# Patient Record
Sex: FEMALE | Race: BLACK OR AFRICAN AMERICAN | NOT HISPANIC OR LATINO | Employment: OTHER | URBAN - METROPOLITAN AREA
[De-identification: names, ages, dates, MRNs, and addresses within clinical notes are randomized per-mention and may not be internally consistent; named-entity substitution may affect disease eponyms.]

---

## 2018-04-17 DIAGNOSIS — J44.89 OBSTRUCTIVE CHRONIC BRONCHITIS WITHOUT EXACERBATION: Primary | ICD-10-CM

## 2018-05-01 ENCOUNTER — HOSPITAL ENCOUNTER (OUTPATIENT)
Dept: PULMONOLOGY | Facility: CLINIC | Age: 68
Discharge: HOME | End: 2018-05-01
Payer: COMMERCIAL

## 2018-05-01 DIAGNOSIS — J44.89 OBSTRUCTIVE CHRONIC BRONCHITIS WITHOUT EXACERBATION: ICD-10-CM

## 2018-05-01 LAB
PRE FEV1 FVC: 81
PRE FEV1: 1.4
PRE FVC: 1.72
PREDICTED FEV1 FVC: 81
PREDICTED FEV1: 1.92
PREDICTED FVC: 2.43

## 2018-05-01 PROCEDURE — 94729 DIFFUSING CAPACITY: CPT | Mod: S$GLB,,, | Performed by: INTERNAL MEDICINE

## 2018-05-01 PROCEDURE — 94010 BREATHING CAPACITY TEST: CPT | Mod: S$GLB,,, | Performed by: INTERNAL MEDICINE

## 2018-07-23 ENCOUNTER — TELEPHONE (OUTPATIENT)
Dept: CARDIOLOGY | Facility: CLINIC | Age: 68
End: 2018-07-23

## 2018-07-23 DIAGNOSIS — R00.2 PALPITATIONS: Primary | ICD-10-CM

## 2018-07-30 ENCOUNTER — OFFICE VISIT (OUTPATIENT)
Dept: CARDIOLOGY | Facility: CLINIC | Age: 68
End: 2018-07-30
Payer: COMMERCIAL

## 2018-07-30 ENCOUNTER — HOSPITAL ENCOUNTER (OUTPATIENT)
Dept: CARDIOLOGY | Facility: CLINIC | Age: 68
Discharge: HOME | End: 2018-07-30
Payer: COMMERCIAL

## 2018-07-30 VITALS
BODY MASS INDEX: 37.61 KG/M2 | SYSTOLIC BLOOD PRESSURE: 180 MMHG | DIASTOLIC BLOOD PRESSURE: 90 MMHG | OXYGEN SATURATION: 96 % | WEIGHT: 191.56 LBS | HEART RATE: 76 BPM | HEIGHT: 60 IN

## 2018-07-30 DIAGNOSIS — I50.43 ACUTE ON CHRONIC COMBINED SYSTOLIC AND DIASTOLIC CONGESTIVE HEART FAILURE: Primary | ICD-10-CM

## 2018-07-30 DIAGNOSIS — I25.2 OLD MI (MYOCARDIAL INFARCTION): ICD-10-CM

## 2018-07-30 DIAGNOSIS — I25.2 HISTORY OF MYOCARDIAL INFARCTION: ICD-10-CM

## 2018-07-30 DIAGNOSIS — J43.1 PANLOBULAR EMPHYSEMA: ICD-10-CM

## 2018-07-30 DIAGNOSIS — Z86.73 HISTORY OF CVA (CEREBROVASCULAR ACCIDENT): ICD-10-CM

## 2018-07-30 DIAGNOSIS — R00.2 PALPITATIONS: ICD-10-CM

## 2018-07-30 DIAGNOSIS — I10 ESSENTIAL HYPERTENSION: ICD-10-CM

## 2018-07-30 PROCEDURE — 93000 ELECTROCARDIOGRAM COMPLETE: CPT | Mod: S$GLB,,, | Performed by: INTERNAL MEDICINE

## 2018-07-30 PROCEDURE — 99999 PR PBB SHADOW E&M-EST. PATIENT-LVL III: CPT | Mod: PBBFAC,,, | Performed by: INTERNAL MEDICINE

## 2018-07-30 PROCEDURE — 99214 OFFICE O/P EST MOD 30 MIN: CPT | Mod: S$GLB,,, | Performed by: INTERNAL MEDICINE

## 2018-07-30 RX ORDER — VALSARTAN 80 MG/1
80 TABLET ORAL DAILY
COMMUNITY

## 2018-07-30 RX ORDER — HYDROCHLOROTHIAZIDE 25 MG/1
25 TABLET ORAL DAILY
COMMUNITY

## 2018-07-30 RX ORDER — FUROSEMIDE 40 MG/1
40 TABLET ORAL 2 TIMES DAILY
Qty: 60 TABLET | Refills: 3 | Status: SHIPPED | OUTPATIENT
Start: 2018-07-30 | End: 2019-07-30

## 2018-07-30 RX ORDER — ISOSORBIDE MONONITRATE 20 MG/1
10 TABLET ORAL 2 TIMES DAILY WITH MEALS
COMMUNITY

## 2018-07-30 RX ORDER — ALBUTEROL SULFATE 0.83 MG/ML
2.5 SOLUTION RESPIRATORY (INHALATION) EVERY 6 HOURS PRN
COMMUNITY

## 2018-07-30 RX ORDER — FUROSEMIDE 40 MG/1
40 TABLET ORAL 2 TIMES DAILY
Qty: 120 TABLET | Refills: 3 | Status: SHIPPED | OUTPATIENT
Start: 2018-07-30 | End: 2018-07-30 | Stop reason: SDUPTHER

## 2018-07-30 NOTE — PROGRESS NOTES
"Subjective:   Patient ID:  Florence Faria is a 67 y.o. female is a new patient who presents for evaluation of Congestive Heart Failure; Hypertension; and Chest Pain    HPI: 67 yo female with hx of HTN, COPD, CVA, and MI in 2015 who was BIBEMS for dry cough over the last two months. She was started on lisinopril two months ago. After one month of cough, she was switched to losartan but continues to have dry cough. Patient has quit smoking approx one year ago. She also reports chest pain and sob when she gets these coughing attacks. Most recent episode was today at approx 1 pm and lasted 3-5 minutes and resolved spontaneously. She has had chest pain in the past without cough that has responded to her home NTG. Denies radiation, diaphoresis, or increased pain with exertion. Pain feels like "a pulling." She reports that her daughter had a seizure today and EMS was activated. EMS noted that she was coughing and complaining of chest pain and recommended she come to the ED.      CT head:  Extensive chronic encephalomalacia in the right MCA distribution and   the left inferior cerebellar hemisphere without posttraumatic abnormality   in the head.    HPI:   Lives by herself.  Too much walking and talking patient gets SOB this has been going on for a year.  Orthopnea, PND.   Sats 96% in the office and 94% with ambulation.     Patient Active Problem List   Diagnosis    Obstructive chronic bronchitis without exacerbation    Old MI (myocardial infarction)    Essential hypertension    History of CVA (cerebrovascular accident)     BP (!) 180/90 (BP Location: Left arm, Patient Position: Sitting, BP Method: X-Large (Automatic))   Pulse 76   Ht 4' 11.5" (1.511 m)   Wt 86.9 kg (191 lb 9.3 oz)   SpO2 96%   BMI 38.05 kg/m²   Body mass index is 38.05 kg/m².  CrCl cannot be calculated (No order found.).    No results found for: NA, K, CL, CO2, BUN, CREATININE, GLU, HGBA1C, MG, AST, ALT, ALBUMIN, PROT, BILITOT, WBC, HGB, HCT, " MCV, PLT, INR, PSA, TSH, CHOL, HDL, LDLCALC, TRIG    Current Outpatient Prescriptions   Medication Sig    albuterol (PROVENTIL) 2.5 mg /3 mL (0.083 %) nebulizer solution Take 2.5 mg by nebulization every 6 (six) hours as needed for Wheezing or Shortness of Breath. Rescue    furosemide (LASIX) 40 MG tablet Take 1 tablet (40 mg total) by mouth 2 (two) times daily.    hydroCHLOROthiazide (HYDRODIURIL) 25 MG tablet Take 25 mg by mouth once daily.    isosorbide mononitrate (ISMO,MONOKET) 20 MG Tab Take 10 mg by mouth 2 (two) times daily with meals.    valsartan (DIOVAN) 80 MG tablet Take 80 mg by mouth once daily.     No current facility-administered medications for this visit.        Review of Systems   Constitution: Negative for chills, decreased appetite, weakness, malaise/fatigue, night sweats, weight gain and weight loss.   Eyes: Negative for blurred vision, double vision, visual disturbance and visual halos.   Cardiovascular: Positive for dyspnea on exertion and orthopnea. Negative for chest pain, claudication, cyanosis, irregular heartbeat, leg swelling, near-syncope, palpitations, paroxysmal nocturnal dyspnea and syncope.   Respiratory: Negative for cough, hemoptysis, snoring, sputum production and wheezing.    Endocrine: Negative for cold intolerance, heat intolerance, polydipsia and polyphagia.   Hematologic/Lymphatic: Negative for adenopathy and bleeding problem. Does not bruise/bleed easily.   Skin: Negative for flushing, itching, poor wound healing and rash.   Musculoskeletal: Negative for arthritis, back pain, falls, gout, joint pain, joint swelling, muscle cramps, muscle weakness, myalgias, neck pain and stiffness.   Gastrointestinal: Negative for bloating, abdominal pain, anorexia, diarrhea, dysphagia, excessive appetite, flatus, hematemesis, jaundice, melena and nausea.   Genitourinary: Negative for hesitancy and incomplete emptying.   Neurological: Negative for aphonia, brief paralysis, difficulty  with concentration, disturbances in coordination, excessive daytime sleepiness, dizziness, focal weakness, light-headedness and loss of balance.   Psychiatric/Behavioral: Negative for altered mental status, depression, hallucinations, hypervigilance, memory loss, substance abuse and suicidal ideas. The patient does not have insomnia and is not nervous/anxious.        Objective:   Physical Exam   Neck: JVD present.   Pulmonary/Chest: She has rales.   Musculoskeletal: She exhibits edema.       Assessment:     1. Acute on chronic combined systolic and diastolic congestive heart failure    2. Essential hypertension    3. Panlobular emphysema    4. History of CVA (cerebrovascular accident)    5. History of myocardial infarction    6. Old MI (myocardial infarction)        Plan:   Florence was seen today for congestive heart failure, hypertension and chest pain.    Diagnoses and all orders for this visit:    Acute on chronic combined systolic and diastolic congestive heart failure  -     2D Echo w/ Color Flow Doppler; Future  -     Comprehensive metabolic panel; Future  -     Brain natriuretic peptide; Future    Essential hypertension    Panlobular emphysema    History of CVA (cerebrovascular accident)    History of myocardial infarction    Old MI (myocardial infarction)    Other orders  -     Discontinue: furosemide (LASIX) 40 MG tablet; Take 1 tablet (40 mg total) by mouth 2 (two) times daily.  -     furosemide (LASIX) 40 MG tablet; Take 1 tablet (40 mg total) by mouth 2 (two) times daily.      Continue all other meds, start lasix  Limit sodium intake to less then 2 gram sodium and 1500cc fluid restriction.  Graded exercise program as tolerated.  Call if more than 3 lbs in 1 day or 5 lbs in 1 week.  Counseled on importance of heart healthy diet low in saturated and trans fat and salt as well gradually starting a regular aerobic exercise regimen with goal of 30min 5x/week. Recommend BP diary. Call if systolic BP > 130 mmHg  on checking repeatedly    RTC 2 wks for up titration of cardiac meds.

## 2018-08-23 ENCOUNTER — OFFICE VISIT (OUTPATIENT)
Dept: CARDIOLOGY | Facility: CLINIC | Age: 68
End: 2018-08-23
Payer: COMMERCIAL

## 2018-08-23 VITALS
HEART RATE: 66 BPM | BODY MASS INDEX: 37.92 KG/M2 | DIASTOLIC BLOOD PRESSURE: 74 MMHG | SYSTOLIC BLOOD PRESSURE: 157 MMHG | HEIGHT: 60 IN | WEIGHT: 193.13 LBS

## 2018-08-23 DIAGNOSIS — I25.2 OLD MI (MYOCARDIAL INFARCTION): ICD-10-CM

## 2018-08-23 DIAGNOSIS — Z86.73 HISTORY OF CVA (CEREBROVASCULAR ACCIDENT): ICD-10-CM

## 2018-08-23 DIAGNOSIS — M15.0 PRIMARY GENERALIZED (OSTEO)ARTHRITIS: Primary | ICD-10-CM

## 2018-08-23 DIAGNOSIS — I10 ESSENTIAL HYPERTENSION: ICD-10-CM

## 2018-08-23 DIAGNOSIS — J43.1 PANLOBULAR EMPHYSEMA: ICD-10-CM

## 2018-08-23 DIAGNOSIS — I50.43 ACUTE ON CHRONIC COMBINED SYSTOLIC AND DIASTOLIC CONGESTIVE HEART FAILURE: Primary | ICD-10-CM

## 2018-08-23 DIAGNOSIS — I25.2 HISTORY OF MYOCARDIAL INFARCTION: ICD-10-CM

## 2018-08-23 PROCEDURE — 99999 PR PBB SHADOW E&M-EST. PATIENT-LVL III: CPT | Mod: PBBFAC,,, | Performed by: INTERNAL MEDICINE

## 2018-08-23 PROCEDURE — 99214 OFFICE O/P EST MOD 30 MIN: CPT | Mod: S$GLB,,, | Performed by: INTERNAL MEDICINE

## 2018-08-23 RX ORDER — METOPROLOL SUCCINATE 25 MG/1
25 TABLET, EXTENDED RELEASE ORAL DAILY
Qty: 90 TABLET | Refills: 3 | Status: SHIPPED | OUTPATIENT
Start: 2018-08-23 | End: 2019-08-23

## 2018-08-23 RX ORDER — ALBUTEROL SULFATE 90 UG/1
2 AEROSOL, METERED RESPIRATORY (INHALATION) EVERY 6 HOURS PRN
COMMUNITY

## 2018-08-23 NOTE — PROGRESS NOTES
"Subjective:   Patient ID:  Florence Faria is a 67 y.o. female who presents for follow-up of Acute on chronic combined systolic and diastolic congestive  (2 weeks fu)      HPI:   Feeling better and has been exercising. Patient has been stretching  And does walking a lot as well.   Patient's BP is normal at [facility].       Spirometry:Normal airflow. Mild restriction. Diffusion capacity is mildly decreased    Patient Active Problem List   Diagnosis    Obstructive chronic bronchitis without exacerbation    Old MI (myocardial infarction)    Essential hypertension    History of CVA (cerebrovascular accident)     BP (!) 157/74 (BP Location: Left arm, Patient Position: Sitting, BP Method: X-Large (Automatic))   Pulse 66   Ht 4' 11.5" (1.511 m)   Wt 87.6 kg (193 lb 2 oz)   BMI 38.35 kg/m²   Body mass index is 38.35 kg/m².  CrCl cannot be calculated (No order found.).    No results found for: NA, K, CL, CO2, BUN, CREATININE, GLU, HGBA1C, MG, AST, ALT, ALBUMIN, PROT, BILITOT, WBC, HGB, HCT, MCV, PLT, INR, PSA, TSH, CHOL, HDL, LDLCALC, TRIG    Current Outpatient Medications   Medication Sig    albuterol (PROVENTIL) 2.5 mg /3 mL (0.083 %) nebulizer solution Take 2.5 mg by nebulization every 6 (six) hours as needed for Wheezing or Shortness of Breath. Rescue    albuterol (VENTOLIN HFA) 90 mcg/actuation inhaler Inhale 2 puffs into the lungs every 6 (six) hours as needed for Wheezing. Rescue    furosemide (LASIX) 40 MG tablet Take 1 tablet (40 mg total) by mouth 2 (two) times daily.    hydroCHLOROthiazide (HYDRODIURIL) 25 MG tablet Take 25 mg by mouth once daily.    isosorbide mononitrate (ISMO,MONOKET) 20 MG Tab Take 10 mg by mouth 2 (two) times daily with meals.    valsartan (DIOVAN) 80 MG tablet Take 80 mg by mouth once daily.     No current facility-administered medications for this visit.        ROS    Objective:   Physical Exam    Assessment:     No diagnosis found.    Plan:     "

## 2018-08-29 ENCOUNTER — TELEPHONE (OUTPATIENT)
Dept: CARDIOLOGY | Facility: CLINIC | Age: 68
End: 2018-08-29

## 2018-08-29 NOTE — TELEPHONE ENCOUNTER
Please tell patient that I have reviewed her labs from [facility] and she should continue her medications as prescribed,

## 2018-10-04 ENCOUNTER — HOSPITAL ENCOUNTER (OUTPATIENT)
Dept: RADIOLOGY | Facility: CLINIC | Age: 68
Discharge: HOME | End: 2018-10-04
Attending: NURSE PRACTITIONER
Payer: COMMERCIAL

## 2018-10-04 DIAGNOSIS — M15.0 PRIMARY GENERALIZED (OSTEO)ARTHRITIS: ICD-10-CM

## 2018-10-04 PROCEDURE — 77080 DXA BONE DENSITY AXIAL: CPT | Mod: 26,,, | Performed by: INTERNAL MEDICINE

## 2018-10-04 PROCEDURE — 77080 DXA BONE DENSITY AXIAL: CPT | Mod: TC

## 2018-10-25 ENCOUNTER — OFFICE VISIT (OUTPATIENT)
Dept: ENDOCRINOLOGY | Facility: CLINIC | Age: 68
End: 2018-10-25
Payer: COMMERCIAL

## 2018-10-25 VITALS
HEIGHT: 60 IN | SYSTOLIC BLOOD PRESSURE: 184 MMHG | WEIGHT: 192.88 LBS | RESPIRATION RATE: 16 BRPM | BODY MASS INDEX: 37.87 KG/M2 | DIASTOLIC BLOOD PRESSURE: 96 MMHG

## 2018-10-25 DIAGNOSIS — E04.2 MULTIPLE THYROID NODULES: Primary | ICD-10-CM

## 2018-10-25 DIAGNOSIS — M81.0 AGE-RELATED OSTEOPOROSIS WITHOUT CURRENT PATHOLOGICAL FRACTURE: ICD-10-CM

## 2018-10-25 PROCEDURE — 99999 PR PBB SHADOW E&M-EST. PATIENT-LVL III: CPT | Mod: PBBFAC,,, | Performed by: INTERNAL MEDICINE

## 2018-10-25 PROCEDURE — 99204 OFFICE O/P NEW MOD 45 MIN: CPT | Mod: S$GLB,,, | Performed by: INTERNAL MEDICINE

## 2018-10-25 NOTE — PROGRESS NOTES
Chief Complaint: Thyroid Nodule      HPI:  Florence Faria is 68 y.o. female presents to clinic today for evaluation of MNG.  Patient is referred by [facility]     She states thyroid nodules were incidentally discovered in 2017 after she sustained a fall and subsequent imaging studies identified a thyroid nodule     She states she then had a repeat thyroid Ultrasound study at [facility]   Copy of report reviewed and dated 07/19/2018  Right thyroid lobe measures 4.8 x 2.24 x 2.24 cm   Isthmus measures 3.35 x 1.9 x 2.39 cm   Left thyroid lobe measures 4.9 x 2.42 x 2.31 cm   IMPRESSION:   In the right lobe there is a 0.23 and 0.3cm nodule   In the left thyroid lobe superiorly there is a nodule measuring 0.5 x 0.4 x 0.5cm   In the left thyroid lobe superiorly and laterally there is a nodule measuring 1.4 x 1.1 x 0.9cm. The left mid thyroid lobe there is a nodule measuring 0.5cm   In the lower lobe of the thyroid on the left there is a nodule measuring 1.3 x 1.2 x 0.9cm   In the isthmus area there is a poorly defined mass lesion measuring 2.5 x 1.8 x 1.9cm   Recommendations: consider biopsy of the lesion in the isthmus area under ultrasound guidance       She denies neck pain, pressure or tenderness at this time   But she endorses voice changes and shortness of breath when lying flat     She denies dysphagia     Family history:   Sister with MNG     Denies family history of thyroid cancer     Denies personal history of radiation exposure to her head, face or neck     No signs or symptoms of hyper or hypothyroidism   No weight changes  No bowel changes   No hair, nail or skin changes    Review of Systems   Constitutional: Positive for fatigue. Negative for unexpected weight change.   HENT: Positive for voice change. Negative for sore throat and trouble swallowing.    Eyes: Negative for visual disturbance.   Respiratory: Positive for cough and shortness of breath.    Cardiovascular: Positive for chest pain (occasionally ),  palpitations and leg swelling.   Gastrointestinal: Negative for constipation and diarrhea.   Endocrine: Positive for heat intolerance. Negative for cold intolerance, polydipsia, polyphagia and polyuria.   Genitourinary: Negative for dysuria and hematuria.        +incontinence    Musculoskeletal: Positive for gait problem.   Skin: Negative for rash.   Allergic/Immunologic: Negative for immunocompromised state.   Neurological: Positive for dizziness. Negative for tremors and syncope.   Hematological: Does not bruise/bleed easily.   Psychiatric/Behavioral: The patient is not nervous/anxious.      Physical Exam   Constitutional: She is oriented to person, place, and time. She appears well-developed.   HENT:   Right Ear: External ear normal.   Left Ear: External ear normal.   Nose: Nose normal.   Hearing normal  Dentition good    Neck: No tracheal deviation present. No thyromegaly present.   Left and isthmus nodule appreciated on examination    Cardiovascular: Normal rate.   No murmur heard.  Pulmonary/Chest: Effort normal and breath sounds normal.   Abdominal: Soft. There is no tenderness. No hernia.   Musculoskeletal: She exhibits no edema.   Gait normal  No clubbing or cyanosis noted   Neurological: She is alert and oriented to person, place, and time. She displays normal reflexes. No cranial nerve deficit.   Skin: No rash noted.   No nodules       Psychiatric: She has a normal mood and affect. Judgment normal.   Nursing note and vitals reviewed.      Labs:  No results found for: TSH, S2QSNFC, S7UQSHP, THYROIDAB   Outside labs in media tab dated 08/2018   TSH - 0.386 ( 0.450-4.500)   Vitamin D - 20.1     Assessment and Plan:  1. Multiple thyroid nodules  -- reviewed management options including observation, FNA or surgery  -- check TSH , if suppressed, recommend NM thyroid uptake and scan prior to biopsy to determine presence of hot/toxic thyroid nodules   -- if TSH is normal, recommend FNA biopsy of isthmus nodule  but we would like to review images if possible before proceeding with the biopsy   -- discussed possible biopsy results including benign, malignant, FLUS or non diagnostic. Advised that non diagnostic or FLUS results will require repeat FNA   -- discussed indications for repeat FNA as well as surgical indications (abnormal FNA or compressive symptoms, interval change)   -     Cancel: TSH; Future; Expected date: 10/25/2018  -     TSH; Future; Expected date: 10/25/2018      Age-related osteoporosis without current pathological fracture  -- noted on BMD from 10/2018   -- reassuring she is not fracturing   -- plan work up for accelerated causes of bone loss   -- recommend labs below   -- discussed treatment options including bisphosphonates, prolia or forteo   -- discussed duration of bisphosphonate use is unknown   -- low risk of ONJ and atypical fracture reviewed and discussed. Alerted that if dental work needs to be done it should be done prior to inititating treatment   -- due to CKD , we recommend starting Prolia 60 mg subcut. Every 6 months  -- RDA of calcium and vitamin D, calcium from food sources are preferred   -- initiate weight bearing exercises as tolerated   -- continue fall safety and fall precautions   --     Calcium, Timed Urine; Future; Expected date: 10/25/2018  -     Creatinine, urine, timed 24 Hours; Future; Expected date: 10/25/2018  -     PTH, intact; Future; Expected date: 10/25/2018  -     Renal function panel; Future; Expected date: 10/25/2018  -     Protein electrophoresis, serum; Future; Expected date: 10/25/2018  -     Tissue transglutaminase, IgA; Future; Expected date: 10/25/2018  -     Vitamin D; Future; Expected date: 10/25/2018        All orders printed and signed and faxed to [facility]   Requested thyroid ultrasound images from [facility]     She is advised to follow up with our office in 6 weeks to review test results     Case discussed in consultation with Dr. Martinez    Recommendations were  discussed with the patient in detail  The patient verbalized understanding and agrees with the plan outlined as above.

## 2018-10-25 NOTE — LETTER
October 26, 2018      Kerline Kamara, APRN  [address] [facility] [address]           [facility] - Endocrinology  [address]  [place] [address]  Phone: [phone]          Patient: Florence Faria   MR Number: [id number]   YOB: 1950   Date of Visit: 10/25/2018       Dear Kerline Kamara:    Thank you for referring Florence Faria to me for evaluation. Attached you will find relevant portions of my assessment and plan of care.    If you have questions, please do not hesitate to call me. I look forward to following Florence Faria along with you.    Sincerely,    Kimber Bobby, NP    Enclosure  CC:  No Recipients    If you would like to receive this communication electronically, please contact [email or web address] or [phone] to request more information on [facility] access.    For providers and/or their staff who would like to refer a patient to [facility], please contact us through our one-stop-shop provider referral line, [facility], at [phone].    If you feel you have received this communication in error or would no longer like to receive these types of communications, please e-mail [email or web address]

## 2018-10-25 NOTE — Clinical Note
Patti, We also need to obtain the thyroid ultrasound images from [facility] if possible. Can you see if they can mail a copy of the disc? Also, tere have her follow up in 6 weeks to review results and determine further course of action Thank you very much

## 2019-01-31 ENCOUNTER — TELEPHONE (OUTPATIENT)
Dept: ENDOCRINOLOGY | Facility: CLINIC | Age: 69
End: 2019-01-31

## 2019-01-31 DIAGNOSIS — E04.2 MULTIPLE THYROID NODULES: Primary | ICD-10-CM

## 2019-01-31 NOTE — TELEPHONE ENCOUNTER
This is an EP of Kimber but she checked out  with Dr Martinez at the last office visit.   Please have Dr Martinez review and place orders and schedule.

## 2019-01-31 NOTE — TELEPHONE ENCOUNTER
----- Message from Taylor Arizmendi sent at 1/31/2019  9:29 AM CST -----  Contact: Delia / Nati [phone]  Jarrett Lin called to schedule a FNA for PT. Delia can be reached at [phone].

## 2019-02-01 NOTE — TELEPHONE ENCOUNTER
I can order the FNA. But I think M Brimmer was trying to obtain the disc with the images prior to this to make sure the nodule meets FNA criteria, as all we had was an outside report.

## 2019-02-06 NOTE — TELEPHONE ENCOUNTER
Spoke with Delia who transferred me to the clinic line. I then left a message for Tia to return call.

## 2024-04-13 ENCOUNTER — HOSPITAL ENCOUNTER (INPATIENT)
Facility: OTHER | Age: 74
LOS: 3 days | Discharge: NURSING, REHAB OR LONG-TERM CARE | DRG: 206 | End: 2024-04-17
Attending: EMERGENCY MEDICINE | Admitting: INTERNAL MEDICINE
Payer: MEDICARE

## 2024-04-13 DIAGNOSIS — N39.0 UTI (URINARY TRACT INFECTION): ICD-10-CM

## 2024-04-13 DIAGNOSIS — R53.81 DEBILITY: ICD-10-CM

## 2024-04-13 DIAGNOSIS — K59.00 CONSTIPATION, UNSPECIFIED CONSTIPATION TYPE: ICD-10-CM

## 2024-04-13 DIAGNOSIS — I67.9 HEMIPLEGIA OF LEFT NONDOMINANT SIDE DUE TO CEREBROVASCULAR DISEASE: Chronic | ICD-10-CM

## 2024-04-13 DIAGNOSIS — R10.9 ABDOMINAL PAIN: ICD-10-CM

## 2024-04-13 DIAGNOSIS — J44.1 CHRONIC OBSTRUCTIVE PULMONARY DISEASE WITH ACUTE EXACERBATION: ICD-10-CM

## 2024-04-13 DIAGNOSIS — R06.00 DYSPNEA: ICD-10-CM

## 2024-04-13 DIAGNOSIS — N30.00 ACUTE CYSTITIS WITHOUT HEMATURIA: Primary | ICD-10-CM

## 2024-04-13 DIAGNOSIS — S22.32XA CLOSED FRACTURE OF ONE RIB OF LEFT SIDE: ICD-10-CM

## 2024-04-13 DIAGNOSIS — G81.94 HEMIPLEGIA OF LEFT NONDOMINANT SIDE DUE TO CEREBROVASCULAR DISEASE: Chronic | ICD-10-CM

## 2024-04-13 PROBLEM — F33.1 DEPRESSION, MAJOR, RECURRENT, MODERATE: Status: RESOLVED | Noted: 2024-03-12 | Resolved: 2024-04-13

## 2024-04-13 PROBLEM — I63.9 CEREBROVASCULAR ACCIDENT (CVA): Status: ACTIVE | Noted: 2020-08-27

## 2024-04-13 PROBLEM — K21.9 GERD (GASTROESOPHAGEAL REFLUX DISEASE): Status: ACTIVE | Noted: 2024-04-13

## 2024-04-13 PROBLEM — E78.5 HYPERLIPIDEMIA: Chronic | Status: ACTIVE | Noted: 2023-01-05

## 2024-04-13 PROBLEM — I25.2 HISTORY OF MYOCARDIAL INFARCTION: Chronic | Status: ACTIVE | Noted: 2018-07-30

## 2024-04-13 PROBLEM — J44.9 CHRONIC OBSTRUCTIVE PULMONARY DISEASE: Status: ACTIVE | Noted: 2019-02-12

## 2024-04-13 PROBLEM — I10 ESSENTIAL HYPERTENSION: Chronic | Status: ACTIVE | Noted: 2018-07-30

## 2024-04-13 PROBLEM — J44.9 CHRONIC OBSTRUCTIVE PULMONARY DISEASE: Chronic | Status: ACTIVE | Noted: 2019-02-12

## 2024-04-13 PROBLEM — I25.10 CORONARY ARTERY DISEASE: Status: ACTIVE | Noted: 2019-09-25

## 2024-04-13 PROBLEM — N39.3 STRESS INCONTINENCE: Chronic | Status: ACTIVE | Noted: 2024-03-12

## 2024-04-13 PROBLEM — N39.3 STRESS INCONTINENCE: Status: ACTIVE | Noted: 2024-03-12

## 2024-04-13 PROBLEM — H90.3 BILATERAL NEURAL HEARING LOSS: Chronic | Status: ACTIVE | Noted: 2024-03-12

## 2024-04-13 PROBLEM — Z86.73 HISTORY OF RIGHT MCA STROKE: Chronic | Status: ACTIVE | Noted: 2018-07-30

## 2024-04-13 PROBLEM — G47.00 INSOMNIA: Status: ACTIVE | Noted: 2024-03-12

## 2024-04-13 PROBLEM — Z87.898 HISTORY OF SEIZURES: Chronic | Status: ACTIVE | Noted: 2024-03-12

## 2024-04-13 PROBLEM — I63.9 CEREBROVASCULAR ACCIDENT (CVA): Status: RESOLVED | Noted: 2020-08-27 | Resolved: 2024-04-13
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
## 2024-04-13 PROBLEM — I25.10 CORONARY ARTERY DISEASE: Chronic | Status: ACTIVE | Noted: 2019-09-25

## 2024-04-13 PROBLEM — R47.1 DYSARTHRIA: Status: ACTIVE | Noted: 2021-06-17

## 2024-04-13 PROBLEM — G40.909 SEIZURE DISORDER: Status: ACTIVE | Noted: 2024-03-12

## 2024-04-13 PROBLEM — F33.1 DEPRESSION, MAJOR, RECURRENT, MODERATE: Status: ACTIVE | Noted: 2024-03-12

## 2024-04-13 PROBLEM — G47.00 INSOMNIA: Status: RESOLVED | Noted: 2024-03-12 | Resolved: 2024-04-13

## 2024-04-13 PROBLEM — H90.3 BILATERAL NEURAL HEARING LOSS: Status: ACTIVE | Noted: 2024-03-12

## 2024-04-13 PROBLEM — E78.5 HYPERLIPIDEMIA: Status: ACTIVE | Noted: 2023-01-05

## 2024-04-13 PROBLEM — R47.1 DYSARTHRIA: Chronic | Status: ACTIVE | Noted: 2021-06-17

## 2024-04-13 LAB
ALBUMIN SERPL BCP-MCNC: 3.6 G/DL (ref 3.5–5.2)
ALP SERPL-CCNC: 144 U/L (ref 55–135)
ALT SERPL W/O P-5'-P-CCNC: 9 U/L (ref 10–44)
ANION GAP SERPL CALC-SCNC: 11 MMOL/L (ref 8–16)
AST SERPL-CCNC: 19 U/L (ref 10–40)
BACTERIA #/AREA URNS HPF: ABNORMAL /HPF
BASOPHILS # BLD AUTO: 0.01 K/UL (ref 0–0.2)
BASOPHILS NFR BLD: 0.1 % (ref 0–1.9)
BILIRUB SERPL-MCNC: 0.4 MG/DL (ref 0.1–1)
BILIRUB UR QL STRIP: NEGATIVE
BNP SERPL-MCNC: 51 PG/ML (ref 0–99)
BUN SERPL-MCNC: 14 MG/DL (ref 8–23)
CALCIUM SERPL-MCNC: 9.3 MG/DL (ref 8.7–10.5)
CHLORIDE SERPL-SCNC: 107 MMOL/L (ref 95–110)
CLARITY UR: ABNORMAL
CO2 SERPL-SCNC: 16 MMOL/L (ref 23–29)
COLOR UR: YELLOW
CREAT SERPL-MCNC: 1.2 MG/DL (ref 0.5–1.4)
CTP QC/QA: YES
DIFFERENTIAL METHOD BLD: ABNORMAL
EOSINOPHIL # BLD AUTO: 0 K/UL (ref 0–0.5)
EOSINOPHIL NFR BLD: 0.1 % (ref 0–8)
ERYTHROCYTE [DISTWIDTH] IN BLOOD BY AUTOMATED COUNT: 14.1 % (ref 11.5–14.5)
EST. GFR  (NO RACE VARIABLE): 47 ML/MIN/1.73 M^2
GLUCOSE SERPL-MCNC: 131 MG/DL (ref 70–110)
GLUCOSE UR QL STRIP: NEGATIVE
HCT VFR BLD AUTO: 43.4 % (ref 37–48.5)
HGB BLD-MCNC: 14.2 G/DL (ref 12–16)
HGB UR QL STRIP: ABNORMAL
IMM GRANULOCYTES # BLD AUTO: 0.03 K/UL (ref 0–0.04)
IMM GRANULOCYTES NFR BLD AUTO: 0.3 % (ref 0–0.5)
KETONES UR QL STRIP: NEGATIVE
LEUKOCYTE ESTERASE UR QL STRIP: ABNORMAL
LYMPHOCYTES # BLD AUTO: 1.5 K/UL (ref 1–4.8)
LYMPHOCYTES NFR BLD: 17.7 % (ref 18–48)
MCH RBC QN AUTO: 30.1 PG (ref 27–31)
MCHC RBC AUTO-ENTMCNC: 32.7 G/DL (ref 32–36)
MCV RBC AUTO: 92 FL (ref 82–98)
MICROSCOPIC COMMENT: ABNORMAL
MONOCYTES # BLD AUTO: 0.3 K/UL (ref 0.3–1)
MONOCYTES NFR BLD: 3 % (ref 4–15)
NEUTROPHILS # BLD AUTO: 6.8 K/UL (ref 1.8–7.7)
NEUTROPHILS NFR BLD: 78.8 % (ref 38–73)
NITRITE UR QL STRIP: POSITIVE
NRBC BLD-RTO: 0 /100 WBC
PH UR STRIP: 6 [PH] (ref 5–8)
PLATELET # BLD AUTO: 174 K/UL (ref 150–450)
PMV BLD AUTO: 11.2 FL (ref 9.2–12.9)
POTASSIUM SERPL-SCNC: 4.3 MMOL/L (ref 3.5–5.1)
PROT SERPL-MCNC: 8.1 G/DL (ref 6–8.4)
PROT UR QL STRIP: NEGATIVE
RBC # BLD AUTO: 4.72 M/UL (ref 4–5.4)
RBC #/AREA URNS HPF: 1 /HPF (ref 0–4)
SARS-COV-2 RDRP RESP QL NAA+PROBE: NEGATIVE
SODIUM SERPL-SCNC: 134 MMOL/L (ref 136–145)
SP GR UR STRIP: 1.02 (ref 1–1.03)
SQUAMOUS #/AREA URNS HPF: 1 /HPF
URN SPEC COLLECT METH UR: ABNORMAL
UROBILINOGEN UR STRIP-ACNC: NEGATIVE EU/DL
WBC # BLD AUTO: 8.6 K/UL (ref 3.9–12.7)
WBC #/AREA URNS HPF: 27 /HPF (ref 0–5)
WBC CLUMPS URNS QL MICRO: ABNORMAL

## 2024-04-13 PROCEDURE — 97165 OT EVAL LOW COMPLEX 30 MIN: CPT

## 2024-04-13 PROCEDURE — 94799 UNLISTED PULMONARY SVC/PX: CPT | Mod: XB

## 2024-04-13 PROCEDURE — 85025 COMPLETE CBC W/AUTO DIFF WBC: CPT | Performed by: EMERGENCY MEDICINE

## 2024-04-13 PROCEDURE — 99900035 HC TECH TIME PER 15 MIN (STAT)

## 2024-04-13 PROCEDURE — 63600175 PHARM REV CODE 636 W HCPCS: Performed by: INTERNAL MEDICINE

## 2024-04-13 PROCEDURE — 25000242 PHARM REV CODE 250 ALT 637 W/ HCPCS: Performed by: EMERGENCY MEDICINE

## 2024-04-13 PROCEDURE — 87040 BLOOD CULTURE FOR BACTERIA: CPT | Mod: 59 | Performed by: EMERGENCY MEDICINE

## 2024-04-13 PROCEDURE — G0378 HOSPITAL OBSERVATION PER HR: HCPCS

## 2024-04-13 PROCEDURE — 25000003 PHARM REV CODE 250: Performed by: INTERNAL MEDICINE

## 2024-04-13 PROCEDURE — 25000242 PHARM REV CODE 250 ALT 637 W/ HCPCS: Performed by: INTERNAL MEDICINE

## 2024-04-13 PROCEDURE — 94640 AIRWAY INHALATION TREATMENT: CPT | Mod: XB

## 2024-04-13 PROCEDURE — 83880 ASSAY OF NATRIURETIC PEPTIDE: CPT | Performed by: EMERGENCY MEDICINE

## 2024-04-13 PROCEDURE — 87635 SARS-COV-2 COVID-19 AMP PRB: CPT | Performed by: EMERGENCY MEDICINE

## 2024-04-13 PROCEDURE — 96376 TX/PRO/DX INJ SAME DRUG ADON: CPT

## 2024-04-13 PROCEDURE — 63700000 PHARM REV CODE 250 ALT 637 W/O HCPCS: Performed by: EMERGENCY MEDICINE

## 2024-04-13 PROCEDURE — 27000221 HC OXYGEN, UP TO 24 HOURS

## 2024-04-13 PROCEDURE — 25000003 PHARM REV CODE 250: Performed by: EMERGENCY MEDICINE

## 2024-04-13 PROCEDURE — 93010 ELECTROCARDIOGRAM REPORT: CPT | Mod: ,,, | Performed by: INTERNAL MEDICINE

## 2024-04-13 PROCEDURE — 96365 THER/PROPH/DIAG IV INF INIT: CPT

## 2024-04-13 PROCEDURE — 87186 SC STD MICRODIL/AGAR DIL: CPT | Performed by: EMERGENCY MEDICINE

## 2024-04-13 PROCEDURE — 80053 COMPREHEN METABOLIC PANEL: CPT | Performed by: EMERGENCY MEDICINE

## 2024-04-13 PROCEDURE — 97161 PT EVAL LOW COMPLEX 20 MIN: CPT

## 2024-04-13 PROCEDURE — 87086 URINE CULTURE/COLONY COUNT: CPT | Performed by: EMERGENCY MEDICINE

## 2024-04-13 PROCEDURE — 96375 TX/PRO/DX INJ NEW DRUG ADDON: CPT

## 2024-04-13 PROCEDURE — 94761 N-INVAS EAR/PLS OXIMETRY MLT: CPT

## 2024-04-13 PROCEDURE — 87088 URINE BACTERIA CULTURE: CPT | Performed by: EMERGENCY MEDICINE

## 2024-04-13 PROCEDURE — 81000 URINALYSIS NONAUTO W/SCOPE: CPT | Performed by: EMERGENCY MEDICINE

## 2024-04-13 PROCEDURE — 96372 THER/PROPH/DIAG INJ SC/IM: CPT | Performed by: INTERNAL MEDICINE

## 2024-04-13 PROCEDURE — 99285 EMERGENCY DEPT VISIT HI MDM: CPT | Mod: 25

## 2024-04-13 PROCEDURE — 87077 CULTURE AEROBIC IDENTIFY: CPT | Performed by: EMERGENCY MEDICINE

## 2024-04-13 PROCEDURE — 63600175 PHARM REV CODE 636 W HCPCS: Performed by: EMERGENCY MEDICINE

## 2024-04-13 PROCEDURE — 93005 ELECTROCARDIOGRAM TRACING: CPT

## 2024-04-13 RX ORDER — ONDANSETRON HYDROCHLORIDE 2 MG/ML
4 INJECTION, SOLUTION INTRAVENOUS EVERY 6 HOURS PRN
Status: DISCONTINUED | OUTPATIENT
Start: 2024-04-13 | End: 2024-04-17 | Stop reason: HOSPADM

## 2024-04-13 RX ORDER — FAMOTIDINE 20 MG/1
20 TABLET, FILM COATED ORAL DAILY
Status: DISCONTINUED | OUTPATIENT
Start: 2024-04-13 | End: 2024-04-17 | Stop reason: HOSPADM

## 2024-04-13 RX ORDER — LOSARTAN POTASSIUM 50 MG/1
100 TABLET ORAL DAILY
Status: DISCONTINUED | OUTPATIENT
Start: 2024-04-13 | End: 2024-04-17 | Stop reason: HOSPADM

## 2024-04-13 RX ORDER — TALC
6 POWDER (GRAM) TOPICAL NIGHTLY PRN
Status: DISCONTINUED | OUTPATIENT
Start: 2024-04-13 | End: 2024-04-17 | Stop reason: HOSPADM

## 2024-04-13 RX ORDER — AMOXICILLIN 250 MG
1 CAPSULE ORAL 2 TIMES DAILY
Status: DISCONTINUED | OUTPATIENT
Start: 2024-04-13 | End: 2024-04-17 | Stop reason: HOSPADM

## 2024-04-13 RX ORDER — ENOXAPARIN SODIUM 100 MG/ML
40 INJECTION SUBCUTANEOUS EVERY 24 HOURS
Status: DISCONTINUED | OUTPATIENT
Start: 2024-04-13 | End: 2024-04-17 | Stop reason: HOSPADM

## 2024-04-13 RX ORDER — IPRATROPIUM BROMIDE AND ALBUTEROL SULFATE 2.5; .5 MG/3ML; MG/3ML
3 SOLUTION RESPIRATORY (INHALATION) EVERY 4 HOURS PRN
Status: DISCONTINUED | OUTPATIENT
Start: 2024-04-13 | End: 2024-04-16

## 2024-04-13 RX ORDER — MORPHINE SULFATE 4 MG/ML
4 INJECTION, SOLUTION INTRAMUSCULAR; INTRAVENOUS EVERY 4 HOURS PRN
Status: DISCONTINUED | OUTPATIENT
Start: 2024-04-13 | End: 2024-04-14

## 2024-04-13 RX ORDER — POLYETHYLENE GLYCOL 3350 17 G/17G
17 POWDER, FOR SOLUTION ORAL 2 TIMES DAILY
Status: DISCONTINUED | OUTPATIENT
Start: 2024-04-13 | End: 2024-04-17 | Stop reason: HOSPADM

## 2024-04-13 RX ORDER — SODIUM CHLORIDE 0.9 % (FLUSH) 0.9 %
10 SYRINGE (ML) INJECTION
Status: DISCONTINUED | OUTPATIENT
Start: 2024-04-13 | End: 2024-04-17 | Stop reason: HOSPADM

## 2024-04-13 RX ORDER — MORPHINE SULFATE 2 MG/ML
2 INJECTION, SOLUTION INTRAMUSCULAR; INTRAVENOUS
Status: COMPLETED | OUTPATIENT
Start: 2024-04-13 | End: 2024-04-13

## 2024-04-13 RX ORDER — CARVEDILOL 12.5 MG/1
1 TABLET ORAL 2 TIMES DAILY WITH MEALS
COMMUNITY
Start: 2024-03-12

## 2024-04-13 RX ORDER — ONDANSETRON HYDROCHLORIDE 2 MG/ML
4 INJECTION, SOLUTION INTRAVENOUS
Status: COMPLETED | OUTPATIENT
Start: 2024-04-13 | End: 2024-04-13

## 2024-04-13 RX ORDER — AZITHROMYCIN 250 MG/1
500 TABLET, FILM COATED ORAL
Status: COMPLETED | OUTPATIENT
Start: 2024-04-13 | End: 2024-04-13

## 2024-04-13 RX ORDER — ACETAMINOPHEN 500 MG
1000 TABLET ORAL 3 TIMES DAILY
Status: DISCONTINUED | OUTPATIENT
Start: 2024-04-13 | End: 2024-04-17 | Stop reason: HOSPADM

## 2024-04-13 RX ORDER — ATORVASTATIN CALCIUM 20 MG/1
80 TABLET, FILM COATED ORAL DAILY
Status: DISCONTINUED | OUTPATIENT
Start: 2024-04-13 | End: 2024-04-17 | Stop reason: HOSPADM

## 2024-04-13 RX ORDER — ASPIRIN 81 MG/1
81 TABLET ORAL DAILY
Status: DISCONTINUED | OUTPATIENT
Start: 2024-04-13 | End: 2024-04-17 | Stop reason: HOSPADM

## 2024-04-13 RX ORDER — METHOCARBAMOL 500 MG/1
500 TABLET, FILM COATED ORAL 3 TIMES DAILY PRN
Status: DISCONTINUED | OUTPATIENT
Start: 2024-04-13 | End: 2024-04-17 | Stop reason: HOSPADM

## 2024-04-13 RX ORDER — ALBUTEROL SULFATE 2.5 MG/.5ML
2.5 SOLUTION RESPIRATORY (INHALATION)
Status: COMPLETED | OUTPATIENT
Start: 2024-04-13 | End: 2024-04-13

## 2024-04-13 RX ORDER — ASPIRIN 81 MG/1
1 TABLET ORAL DAILY
COMMUNITY
Start: 2024-03-12

## 2024-04-13 RX ORDER — ATORVASTATIN CALCIUM 80 MG/1
1 TABLET, FILM COATED ORAL DAILY
COMMUNITY
Start: 2024-03-12

## 2024-04-13 RX ORDER — AMLODIPINE BESYLATE 5 MG/1
5 TABLET ORAL DAILY
Status: DISCONTINUED | OUTPATIENT
Start: 2024-04-13 | End: 2024-04-17 | Stop reason: HOSPADM

## 2024-04-13 RX ORDER — MORPHINE SULFATE 2 MG/ML
2 INJECTION, SOLUTION INTRAMUSCULAR; INTRAVENOUS EVERY 4 HOURS PRN
Status: DISCONTINUED | OUTPATIENT
Start: 2024-04-13 | End: 2024-04-14

## 2024-04-13 RX ORDER — ONDANSETRON 4 MG/1
4 TABLET, ORALLY DISINTEGRATING ORAL EVERY 6 HOURS PRN
Status: DISCONTINUED | OUTPATIENT
Start: 2024-04-13 | End: 2024-04-17 | Stop reason: HOSPADM

## 2024-04-13 RX ORDER — CARVEDILOL 12.5 MG/1
12.5 TABLET ORAL 2 TIMES DAILY WITH MEALS
Status: DISCONTINUED | OUTPATIENT
Start: 2024-04-13 | End: 2024-04-17 | Stop reason: HOSPADM

## 2024-04-13 RX ORDER — ACETAMINOPHEN 325 MG/1
650 TABLET ORAL EVERY 4 HOURS PRN
Status: DISCONTINUED | OUTPATIENT
Start: 2024-04-13 | End: 2024-04-13

## 2024-04-13 RX ORDER — TIOTROPIUM BROMIDE AND OLODATEROL 3.124; 2.736 UG/1; UG/1
2 SPRAY, METERED RESPIRATORY (INHALATION) DAILY
COMMUNITY
Start: 2024-03-12

## 2024-04-13 RX ORDER — FUROSEMIDE 20 MG/1
40 TABLET ORAL DAILY
Status: DISCONTINUED | OUTPATIENT
Start: 2024-04-13 | End: 2024-04-17 | Stop reason: HOSPADM

## 2024-04-13 RX ORDER — FAMOTIDINE 20 MG/1
1 TABLET, FILM COATED ORAL DAILY
COMMUNITY
Start: 2024-03-12

## 2024-04-13 RX ORDER — FLUTICASONE FUROATE AND VILANTEROL 100; 25 UG/1; UG/1
1 POWDER RESPIRATORY (INHALATION) DAILY
Status: DISCONTINUED | OUTPATIENT
Start: 2024-04-13 | End: 2024-04-17 | Stop reason: HOSPADM

## 2024-04-13 RX ORDER — AMLODIPINE BESYLATE 5 MG/1
1 TABLET ORAL DAILY
Status: ON HOLD | COMMUNITY
Start: 2024-03-12 | End: 2024-04-17

## 2024-04-13 RX ORDER — LOSARTAN POTASSIUM 100 MG/1
1 TABLET ORAL DAILY
COMMUNITY
Start: 2024-03-12

## 2024-04-13 RX ORDER — BENZONATATE 100 MG/1
100 CAPSULE ORAL
Status: COMPLETED | OUTPATIENT
Start: 2024-04-13 | End: 2024-04-13

## 2024-04-13 RX ADMIN — POLYETHYLENE GLYCOL 3350 17 G: 17 POWDER, FOR SOLUTION ORAL at 08:04

## 2024-04-13 RX ADMIN — ALBUTEROL SULFATE 2.5 MG: 2.5 SOLUTION RESPIRATORY (INHALATION) at 04:04

## 2024-04-13 RX ADMIN — ASPIRIN 81 MG: 81 TABLET, COATED ORAL at 09:04

## 2024-04-13 RX ADMIN — CARVEDILOL 12.5 MG: 12.5 TABLET, FILM COATED ORAL at 09:04

## 2024-04-13 RX ADMIN — CEFTRIAXONE SODIUM 1 G: 1 INJECTION, POWDER, FOR SOLUTION INTRAMUSCULAR; INTRAVENOUS at 04:04

## 2024-04-13 RX ADMIN — ACETAMINOPHEN 1000 MG: 500 TABLET ORAL at 08:04

## 2024-04-13 RX ADMIN — CARVEDILOL 12.5 MG: 12.5 TABLET, FILM COATED ORAL at 05:04

## 2024-04-13 RX ADMIN — LOSARTAN POTASSIUM 100 MG: 50 TABLET, FILM COATED ORAL at 09:04

## 2024-04-13 RX ADMIN — SENNOSIDES AND DOCUSATE SODIUM 1 TABLET: 8.6; 5 TABLET ORAL at 09:04

## 2024-04-13 RX ADMIN — MORPHINE SULFATE 2 MG: 2 INJECTION, SOLUTION INTRAMUSCULAR; INTRAVENOUS at 04:04

## 2024-04-13 RX ADMIN — SENNOSIDES AND DOCUSATE SODIUM 1 TABLET: 8.6; 5 TABLET ORAL at 08:04

## 2024-04-13 RX ADMIN — AZITHROMYCIN DIHYDRATE 500 MG: 250 TABLET ORAL at 05:04

## 2024-04-13 RX ADMIN — ENOXAPARIN SODIUM 40 MG: 40 INJECTION SUBCUTANEOUS at 05:04

## 2024-04-13 RX ADMIN — MORPHINE SULFATE 4 MG: 4 INJECTION, SOLUTION INTRAMUSCULAR; INTRAVENOUS at 07:04

## 2024-04-13 RX ADMIN — BENZONATATE 100 MG: 100 CAPSULE ORAL at 04:04

## 2024-04-13 RX ADMIN — ONDANSETRON 4 MG: 2 INJECTION INTRAMUSCULAR; INTRAVENOUS at 03:04

## 2024-04-13 RX ADMIN — FLUTICASONE FUROATE AND VILANTEROL TRIFENATATE 1 PUFF: 100; 25 POWDER RESPIRATORY (INHALATION) at 08:04

## 2024-04-13 RX ADMIN — MORPHINE SULFATE 4 MG: 4 INJECTION, SOLUTION INTRAMUSCULAR; INTRAVENOUS at 11:04

## 2024-04-13 RX ADMIN — METHOCARBAMOL 500 MG: 500 TABLET ORAL at 05:04

## 2024-04-13 RX ADMIN — AMLODIPINE BESYLATE 5 MG: 5 TABLET ORAL at 09:04

## 2024-04-13 RX ADMIN — FUROSEMIDE 40 MG: 20 TABLET ORAL at 09:04

## 2024-04-13 RX ADMIN — TIOTROPIUM BROMIDE INHALATION SPRAY 2 PUFF: 3.12 SPRAY, METERED RESPIRATORY (INHALATION) at 08:04

## 2024-04-13 RX ADMIN — MORPHINE SULFATE 4 MG: 4 INJECTION, SOLUTION INTRAMUSCULAR; INTRAVENOUS at 03:04

## 2024-04-13 RX ADMIN — FAMOTIDINE 20 MG: 20 TABLET ORAL at 09:04

## 2024-04-13 RX ADMIN — POLYETHYLENE GLYCOL 3350 17 G: 17 POWDER, FOR SOLUTION ORAL at 09:04

## 2024-04-13 RX ADMIN — ACETAMINOPHEN 650 MG: 325 TABLET, FILM COATED ORAL at 07:04

## 2024-04-13 NOTE — ASSESSMENT & PLAN NOTE
- Start polyethylene glycol 17g PO BID, senna-docusate 8.6-50mg PO BID.  - Monitor for BM and adjust as required.

## 2024-04-13 NOTE — PT/OT/SLP EVAL
Physical Therapy Evaluation    Patient Name:  Florence Faria   MRN:  [id number]    Recommendations:     Discharge Recommendations:  (DEFER RECS UNTIL OOB assessed)   Discharge Equipment Recommendations: none (assess when OOB assessed)   Barriers to discharge: Decreased caregiver support    Assessment:     Florence Faria is a 73 y.o. female admitted with a medical diagnosis of Closed fracture of one rib of left side.  She presents with the following impairments/functional limitations: weakness, impaired endurance, impaired sensation, impaired self care skills, impaired functional mobility, gait instability, impaired balance, decreased ROM, decreased lower extremity function, decreased upper extremity function, pain .      Pt presents with left hemiplegia has 2/5 in left dorsiflexors unable to assess more proximal muscles of leg today secondary to pain. Obtained history with OT but once moving patient she started having abdominal pain and asked to defer mobility until tomorrow. For this reason, defer all recs until tomorrow.     Rehab Prognosis: Fair; patient would benefit from acute skilled PT services to address these deficits and reach maximum level of function.    Recent Surgery: * No surgery found *      Plan:     During this hospitalization, patient to be seen 5 x/week to address the identified rehab impairments via gait training, therapeutic activities, therapeutic exercises and progress toward the following goals:    Plan of Care Expires:  05/14/24    Subjective     Chief Complaint: abdominal pain   Patient/Family Comments/goals: I will do as much as I can today.   Pain/Comfort:  Pain Rating 1: 8/10  Location - Side 1: Bilateral  Location - Orientation 1: midline  Location 1: abdomen  Pain Addressed 1: Pre-medicate for activity, Reposition, Distraction, Cessation of Activity  Pain Rating Post-Intervention 1: 0/10    Patients cultural, spiritual, Yazidi conflicts given the current situation:  "no    Living Environment:  Pt lives alone in a single story senior living aptmt with 0 steps to enter    Pt has a tub/shower with grab bars and TTB, standard toilet with grab bars but has BSC near bed.   Prior level of function:  Ambulation: w/c, but can self transfer using ivon walker   Falls: denies     Prior to admission, patients level of function was mod i.  Equipment used at home: bedside commode, bath bench, wheelchair, walker, ivon, oxygen, grab bar.  DME owned (not currently used): none.  Upon discharge, patient will have assistance from daughter ("disabled " with disabled son).    Objective:     Communicated with nurses prior to session.  Patient found left sidelying with blood pressure cuff, peripheral IV, pulse ox (continuous), telemetry, rosario catheter, oxygen (2L)  upon PT entry to room.    General Precautions: Standard, fall  Orthopedic Precautions:N/A   Braces: N/A  Respiratory Status: Nasal cannula, flow 2 L/min    Exams:  Cognitive Exam:  Patient is oriented to Person, Place, and Situation  LLE ROM: Deficits: deficits throughout LE  LLE Strength: 2/5 DF - proximal muscles not tested    Functional Mobility:  Rolling to left/right = min A  Scooting HOB Max of 2 persons      AM-PAC 6 CLICK MOBILITY  Total Score:9       Treatment & Education:   PT educated patient on the following:   PT plan of care/role of PT  Safety with transfers, use of AD, OOB mobility  Not to get OOB without nurse present   Use of call button for needs (including to get OOB)  Discharge disposition    Pt verbalized understanding      Patient left HOB elevated with all lines intact, call button in reach, and nurse present.    GOALS:   Multidisciplinary Problems       Physical Therapy Goals          Problem: Physical Therapy    Goal Priority Disciplines Outcome Goal Variances Interventions   Physical Therapy Goal     PT, PT/OT Ongoing, Progressing     Description: ASSESS OUT OF BED MOBILITY and develop appropriate goals    "                    History:     Past Medical History:   Diagnosis Date    Heart attack     Stroke        No past surgical history on file.    Time Tracking:     PT Received On: 04/13/24  PT Start Time: 0910     PT Stop Time: 0930  PT Total Time (min): 20 min     Billable Minutes: Evaluation 10 minutes co eval with OT      04/13/2024

## 2024-04-13 NOTE — ED TRIAGE NOTES
73 YOF presents to ED with c/o RUQ 8/10, n/v and worsening SOB tonight. - red tint. -diarrhea. PMH COPD on 2 L nc. Per EMS administered duoneb en route with relief. -CP. Currently on 2 L nc O2 94-96% on 2 L nc. A&Ox4. Denies any other complaints.     LOC: The patient is awake, alert and aware of environment with an appropriate affect, the patient is oriented x 3.  APPEARANCE: Patient resting comfortably and in no acute distress, patient is clean and well groomed, patient's clothing is properly fastened.  SKIN: The skin is warm and dry, patient has normal skin turgor and moist mucus membranes, skin intact, no breakdown or brusing noted.  MUSKULOSKELETAL: PMH Stroke with deficits noted to left upper and left lower extremity.   RESPIRATORY: Airway is open and patent, respirations are spontaneous, patient has a normal effort and rate.  CARDIAC: Peripheral edema.    ED workup in progress. VSS. Safety measures in place; side rails up x2. Call light within pt reach. Will continue to monitor.

## 2024-04-13 NOTE — PT/OT/SLP EVAL
"Occupational Therapy   Evaluation    Name: Florence Faria  MRN: [id number]  Admitting Diagnosis: Closed fracture of one rib of left side  Recent Surgery: * No surgery found *      Recommendations:     Discharge Recommendations:  (Defer at this time, pending assessment of EOB/OOB activity)  Discharge Equipment Recommendations:   (TBD)  Barriers to discharge:  Decreased caregiver support (Current functional level)    Assessment:     Florence Faria is a 73 y.o. female with a medical diagnosis of Closed fracture of one rib of left side.  She presents with the following performance deficits affecting function: weakness, impaired endurance, impaired sensation, impaired self care skills, impaired functional mobility, gait instability, impaired balance, decreased upper extremity function, decreased lower extremity function, impaired coordination, pain, decreased ROM.      OT orders received, chart reviewed. Evaluation completed, POC established. Evaluation limited this date 2/2 pt with 8/10 pain, attempted sup>sit but pt needing to lay back down immediately 2/2 pain. Pt with L ivon, L UE contracture at baseline 2/2 previous CVA. Will continue to follow to assess EOB/OOB ADL/mobility and progress as tolerated. It appears that pt has been having difficulty caring for herself at home, has some assist from family and is awaiting approval of caregiver from insurance. DC and DME recs pending further assessment of EOB/OOB ADL/mobility.     Rehab Prognosis: Fair; patient would benefit from acute skilled OT services to address these deficits and reach maximum level of function.       Plan:     Patient to be seen 5 x/week to address the above listed problems via self-care/home management, therapeutic activities, therapeutic exercises, neuromuscular re-education  Plan of Care Expires:    Plan of Care Reviewed with: patient    Subjective     Chief Complaint: abdominal pain  Patient/Family Comments/goals: "I try to be as " "independent as I can"    Occupational Profile:  Living Environment: Pt lives in a senior living apt, elevator access (level entry to building). Pt has a tub/shower with a tub bench and grab bars. Pt has a raised toilet with rails.   Previous level of function: Pt reporting difficulty with all ADL - completes as much as possible herself but relies on neighbors/family for assist. Pt reports she primarily uses w/c for mobility but states she uses ivon-walker for mobility in bathroom, most difficulty with tub transfers. Pt relies on R[employer] for transport to grocery/appts. Pt is waiting on approval from [employer] for caregiver. Pt's daughter is able to assist occasionally but pt reports she is "disabled " herself and is caregiver for son with CP.   Roles and Routines: mother to adult children, grandmother  Equipment Used at Home: bath bench, grab bar, raised toilet, walker, ivon, wheelchair, oxygen, bedside commode  Assistance upon Discharge: inconsistent assist from family/neighbors    Pain/Comfort:  Pain Rating 1: 8/10  Location - Side 1: Left  Location 1: abdomen  Pain Addressed 1: Reposition, Distraction, Cessation of Activity (RN aware)  Pain Rating Post-Intervention 1: 8/10    Patients cultural, spiritual, Holiness conflicts given the current situation: no    Objective:     Communicated with: RN prior to session.  Patient found left sidelying with blood pressure cuff, rosario catheter, peripheral IV, pulse ox (continuous), oxygen, telemetry upon OT entry to room.    General Precautions: Standard, fall  Orthopedic Precautions: N/A  Braces: N/A  Respiratory Status: Nasal cannula, flow 2 L/min    Occupational Performance:    Bed Mobility:    Pt in partial L sidelying, attempted sup>sit with Min A but needing to lay back down immediately 2/2 pain, continue to assess  Max A x 2 scoot HOB    Functional Mobility/Transfers:  Continue to assess, evaluation limited 2/2 pt pain      Activities of Daily Living:  Continue " to assess, see above    Cognitive/Visual Perceptual:  Cognitive/Psychosocial Skills:     -       Oriented to: Person, Place, Time, and Situation   -       Follows Commands/attention:Follows one-step commands  -       Communication: occasional dysarthria  -       Memory: No Deficits noted  -       Safety awareness/insight to disability: intact   -       Mood/Affect/Coping skills/emotional control: Cooperative and Pleasant  Visual/Perceptual:      -Pt reports weighing glasses at home, unclear if for distance vs. reading      Physical Exam:  Balance:    -       Continue to assess  Sensation:    -       Impaired  light/touch L UE  Dominant hand:    -       R  Upper Extremity Range of Motion:     -       Right Upper Extremity: WFL  -       Left Upper Extremity: L UE contracted in flexion 2/2 previous CVA, no observed active movement, continue to assess (difficulty accurately assessing 2/2 pt's position in bed in L sidelying)  Upper Extremity Strength:    -       Right Upper Extremity: WFL  -       Left Upper Extremity: L UE contracted in flexion 2/2 previous CVA, no observed active movement, continue to assess (difficulty accurately assessing 2/2 pt's position in bed in L sidelying)   Strength:    -       Right Upper Extremity: WFL  -       Left Upper Extremity: impaired, L ivon at baseline, see above  Fine Motor Coordination: R appears WFL, L impaired at baseline (L ivon)    AMPAC 6 Click ADL:  AMPAC Total Score: 14    Treatment & Education:  -Education on role of OT, POC, use of call light    Patient left left sidelying with all lines intact and call button in reach    GOALS:   Multidisciplinary Problems       Occupational Therapy Goals          Problem: Occupational Therapy    Goal Priority Disciplines Outcome Interventions   Occupational Therapy Goal     OT, PT/OT Ongoing, Progressing    Description: OT goals to be met by 4/27/24  1. Bed mobility needed for participation in EOB/OOB ADL with SBA  2. UB dressing,  ivon technique, with Setup/SPV  3. LB dressing, adaptive technique with Min A  4. Functional transfers with LRAD and Min A                       History:     Past Medical History:   Diagnosis Date    Heart attack     Hyperlipidemia     Hypertension     Stroke        No past surgical history on file.    Time Tracking:     OT Date of Treatment: 04/13/24  OT Start Time: 0910  OT Stop Time: 0930  OT Total Time (min): 20 min    Billable Minutes:Evaluation 20    Overlap with PT for portions of session due to complex nature of pt and need for increased safety.  Two skilled therapists needed during functional mobility to decrease patient fall risk and decrease risk of caregiver injury.       4/13/2024

## 2024-04-13 NOTE — HPI
Ms. Faria is a 73/F with PMH HTN, HLD, CAD, h/o MI, h/o stroke x2 with residual left-sided hemiplegia who presented to [facility] 04/13 with a 2 week history of progressive left-sided chest wall and abdomen pain.  Two weeks prior to presentation she fell onto her left side while getting out of the bathtub; she went to [facility] and was told she had rib contusion and discharged home. Has had continued difficulty getting around her house and has been less mobile as well.  For the four days prior to presentation she had poor PO intake due to her decreased mobility.  She notes intermittent chills and dry cough for the two days prior to presentation with some abdominal discomfort, nausea and vomiting as well as no BM for the four days prior to presentation. With persistent discomfort she contacted EMS and received nebulization en route. In ED, workup was notable for UA with 27 WBCs, many bacteria, nitrite positive, no leukocytosis, CXR with potential L basilar atelectasis, and CT Abd/Pelvis with nonspecific gallbladder distention with mild displaced posterior L 8th rib fracture. She received ceftriaxone, azithromycin, morphine IV and hospital medicine was contacted for observation.

## 2024-04-13 NOTE — ED PROVIDER NOTES
"Encounter Date: 4/13/2024    SCRIBE #1 NOTE: I, Lissa Matias, am scribing for, and in the presence of,  Mariama Fisher MD. I have scribed the following portions of the note - the EKG reading. Other sections scribed: HPI, ROS, and physical exam.       History     Chief Complaint   Patient presents with    Abdominal Pain     Patient presents to the ED via [facility] with reports of having abdominal pain with constipation x 4 days, nausea, and vomiting. EMS reports on initial assessment patient also having shortness of breath and wheezing. Room air oxygen saturation was 93%. Treated with duoneb en route.     Shortness of Breath       This is a 73 y.o. female with a PMHx of remote MI, Right MCA stroke with residual weakness, CAD, COPD, HTN, and HLD who presents via EMS with multiple complaints. She reports that she has been constipated and having abdominal pain for the past 4 days. She adds that yesterday morning her abdominal pain worsened, so she decided to take Miralax. She notes that today she also had an episode of nausea and vomiting. She also reports a dry cough and chills for the past 1-2 days. She denies fever, difficulty urinating, body-aches, and recent sick contacts. She also reports that PTA she felt short of breath and "like my lungs were jeremias," which made her activate EMS. EMS reports that the patient's RA saturation was 93% and she was given a DuoNeb en route. Patient states that she felt better after the DuoNeb. She notes that she is on 2L of oxygen at home, but this was not providing relief and she could not get to her home breathing treatments secondary to abdominal pain with movement. Patient is living in a senior living home and uses a walker to ambulate. She states that she is compliant with her home medications.    This is the extent of the patient's complaints at this time.      The history is provided by the patient and the EMS personnel.     Review of patient's allergies " indicates:  No Known Allergies  Past Medical History:   Diagnosis Date    Heart attack     Stroke      No past surgical history on file.  Family History   Problem Relation Name Age of Onset    Heart attack Son       Social History     Tobacco Use    Smoking status: Former     Current packs/day: 0.00     Types: Cigarettes     Quit date: 1/1/2014     Years since quitting: 10.2    Smokeless tobacco: Never   Substance Use Topics    Alcohol use: No     Review of Systems   Constitutional:  Positive for chills. Negative for fever and unexpected weight change.   HENT:  Negative for nosebleeds.    Eyes:  Negative for pain.   Respiratory:  Positive for cough (dry) and shortness of breath.    Cardiovascular:  Negative for palpitations.   Gastrointestinal:  Positive for abdominal pain, constipation, nausea and vomiting. Negative for diarrhea.   Genitourinary:  Negative for difficulty urinating and enuresis.   Musculoskeletal:  Negative for myalgias.   Skin:  Negative for pallor.   Hematological:  Does not bruise/bleed easily.   Psychiatric/Behavioral:  Negative for sleep disturbance.        Physical Exam     Initial Vitals   BP Pulse Resp Temp SpO2   04/13/24 0300 04/13/24 0300 04/13/24 0300 04/13/24 0334 04/13/24 0300   (!) 150/98 77 (!) 24 98.3 °F (36.8 °C) 100 %      MAP       --                Physical Exam    Nursing note and vitals reviewed.  Constitutional: She appears well-developed and well-nourished. She does not have a sickly appearance. No distress. Nasal cannula in place.   HENT:   Head: Normocephalic and atraumatic.   Right Ear: External ear normal.   Left Ear: External ear normal.   Eyes: Conjunctivae, EOM and lids are normal. Right eye exhibits no discharge. Left eye exhibits no discharge. Right conjunctiva is not injected. Right conjunctiva has no hemorrhage. Left conjunctiva is not injected. Left conjunctiva has no hemorrhage. No scleral icterus.   Neck: Phonation normal. No stridor present. No tracheal  deviation present. No JVD present.   No JVD.   Normal range of motion.  Cardiovascular:  Normal rate, regular rhythm and normal heart sounds.     Exam reveals no friction rub.       No murmur heard.  Pulses:       Radial pulses are 2+ on the right side and 2+ on the left side.        Dorsalis pedis pulses are 2+ on the right side and 2+ on the left side.   No leg edema.   Pulmonary/Chest: Breath sounds normal. No respiratory distress. She has no wheezes. She has no rhonchi. She has no rales.   Abdominal: Abdomen is soft. She exhibits no distension.   No focal abdominal tenderness. There is no rebound and no guarding.   Musculoskeletal:      Cervical back: Normal range of motion.      Comments: Contracted left upper extremity.     Neurological: She is alert and oriented to person, place, and time. She has normal strength. GCS eye subscore is 4. GCS verbal subscore is 5. GCS motor subscore is 6.   Skin: Skin is warm.   Psychiatric: She has a normal mood and affect. Her speech is normal and behavior is normal. Judgment and thought content normal. Cognition and memory are normal.         ED Course   Procedures  Labs Reviewed   CBC W/ AUTO DIFFERENTIAL - Abnormal; Notable for the following components:       Result Value    Gran % 78.8 (*)     Lymph % 17.7 (*)     Mono % 3.0 (*)     All other components within normal limits   COMPREHENSIVE METABOLIC PANEL - Abnormal; Notable for the following components:    Sodium 134 (*)     CO2 16 (*)     Glucose 131 (*)     Alkaline Phosphatase 144 (*)     ALT 9 (*)     eGFR 47 (*)     All other components within normal limits   URINALYSIS, REFLEX TO URINE CULTURE - Abnormal; Notable for the following components:    Appearance, UA Cloudy (*)     Occult Blood UA Trace (*)     Nitrite, UA Positive (*)     Leukocytes, UA 3+ (*)     All other components within normal limits    Narrative:     Specimen Source->Urine   URINALYSIS MICROSCOPIC - Abnormal; Notable for the following components:     WBC, UA 27 (*)     WBC Clumps, UA Occasional (*)     Bacteria Many (*)     All other components within normal limits    Narrative:     Specimen Source->Urine   CULTURE, URINE   CULTURE, BLOOD   CULTURE, BLOOD   B-TYPE NATRIURETIC PEPTIDE   SARS-COV-2 RDRP GENE     EKG Readings: (Independently Interpreted)   Initial Reading: No STEMI.   Normal sinus rhythm. Rate of 83. Motion artifact present. No significant ST wave changes compared to July 2018.       Imaging Results              CT Abdomen Pelvis  Without Contrast (In process)                      X-Ray Abdomen Flat And Erect (In process)                      X-Ray Chest AP Portable (In process)                      Medications   cefTRIAXone (Rocephin) 1 g in dextrose 5 % in water (D5W) 100 mL IVPB (MB+) (0 g Intravenous Paused 4/13/24 5322)   azithromycin tablet 500 mg (has no administration in time range)   ondansetron injection 4 mg (4 mg Intravenous Given 4/13/24 7916)   albuterol sulfate nebulizer solution 2.5 mg (2.5 mg Nebulization Given 4/13/24 9042)   benzonatate capsule 100 mg (100 mg Oral Given 4/13/24 6116)   morphine injection 2 mg (2 mg Intravenous Given 4/13/24 4336)     Medical Decision Making  73-year-old female with history CVA, hypertension, CAD, CHF, and COPD brought in by EMS for evaluation of abdominal cramping.  She was also noted to be dyspneic with wheezing on scene.  Respiratory symptoms improved with albuterol neb given EN route.  On initial exam the patient appeared in no respiratory distress.  No significant signs of volume overload on exam.  She also had no focal abdominal tenderness.    Constipation - not typical.  Likely secondary to recent dietary changes.  Abdominal x-ray without evidence bowel obstruction.  CT obtained for further evaluation abdominal pain and without significant stool burden.  She does have a UTI for which she was given Rocephin.    Dyspnea - initial O2 sat 100% on home 2 L. while waiting for her workup, she  did develop coughing and received an additional albuterol neb as well as Tessalon Perles.  Chest x-ray with questionable left lower lobe pneumonia per Radiology.  Will add azithromycin in addition to Rocephin initially given for UTI to treat for community-acquired pneumonia.  The patient states her last hospital admission was 2 years ago at [facility].    Case has been discussed with Colette Arroyo PA-C and the patient will be admitted to the hospitalist service.    Amount and/or Complexity of Data Reviewed  Labs: ordered.  Radiology: ordered.    Risk  Prescription drug management.            Scribe Attestation:   Scribe #1: I performed the above scribed service and the documentation accurately describes the services I performed. I attest to the accuracy of the note.        ED Course as of 04/13/24 0601   Sat Apr 13, 2024   0539 VRad CT abd read - mildly displaced  posterior left 8th rib fx.    Pt denies any recent falls/injuries and she denies any back pain.  [AA]      ED Course User Index  [AA] Mariama Fisher MD                     Physician Attestation for Scribe: I, Mariama Fisher  , reviewed documentation as scribed in my presence, which is both accurate and complete.      Clinical Impression:  Final diagnoses:  [R06.00] Dyspnea  [R10.9] Abdominal pain  [N30.00] Acute cystitis without hematuria (Primary)  [K59.00] Constipation, unspecified constipation type          ED Disposition Condition    Observation Stable                Mariama Fisher MD  04/13/24 0523       Mariama Fisher MD  04/13/24 0601

## 2024-04-13 NOTE — SUBJECTIVE & OBJECTIVE
Past Medical History:   Diagnosis Date    Heart attack     Hyperlipidemia     Hypertension     Stroke      No past surgical history on file.    Review of patient's allergies indicates:  No Known Allergies    Current Facility-Administered Medications   Medication Dose Route Frequency Provider Last Rate Last Admin    acetaminophen tablet 1,000 mg  1,000 mg Oral TID GOPAL Alvares MD        albuterol-ipratropium 2.5 mg-0.5 mg/3 mL nebulizer solution 3 mL  3 mL Nebulization Q4H PRN GOPAL Alvares MD        amLODIPine tablet 5 mg  5 mg Oral Daily GOPAL Alvares MD   5 mg at 04/13/24 0902    aspirin EC tablet 81 mg  81 mg Oral Daily GOPAL Alvares MD   81 mg at 04/13/24 0902    atorvastatin tablet 80 mg  80 mg Oral Daily GOPAL Alvares MD        carvediloL tablet 12.5 mg  12.5 mg Oral BID WM GOPAL Alvares MD   12.5 mg at 04/13/24 0903    [START ON 4/14/2024] cefTRIAXone (Rocephin) 1 g in dextrose 5 % in water (D5W) 100 mL IVPB (MB+)  1 g Intravenous Q24H GOPAL Alvares MD        enoxaparin injection 40 mg  40 mg Subcutaneous Daily GOPAL Alvares MD        famotidine tablet 20 mg  20 mg Oral Daily GOPAL Alvares MD   20 mg at 04/13/24 0903    fluticasone furoate-vilanteroL 100-25 mcg/dose diskus inhaler 1 puff  1 puff Inhalation Daily GOPAL Alvares MD   1 puff at 04/13/24 0829    furosemide tablet 40 mg  40 mg Oral Daily GOPAL Alvares MD   40 mg at 04/13/24 0905    losartan tablet 100 mg  100 mg Oral Daily GOPAL Alvares MD   100 mg at 04/13/24 0904    melatonin tablet 6 mg  6 mg Oral Nightly PRN GOPAL Alvares MD        methocarbamoL tablet 500 mg  500 mg Oral TID PRN GOPAL Alvares MD        morphine injection 2 mg  2 mg Intravenous Q4H PRN GOPAL Alvares MD        morphine injection 4 mg  4 mg Intravenous Q4H PRN GOPAL Alvares MD   4 mg at 04/13/24 0748    ondansetron disintegrating tablet 4 mg  4 mg Oral Q6H PRN GOPAL Alvares MD        ondansetron injection 4 mg   4 mg Intravenous Q6H PRN GOPAL Alvares MD        polyethylene glycol packet 17 g  17 g Oral BID GOPAL Alvares MD   17 g at 04/13/24 0901    senna-docusate 8.6-50 mg per tablet 1 tablet  1 tablet Oral BID GOPAL Alvares MD   1 tablet at 04/13/24 0902    sodium chloride 0.9% flush 10 mL  10 mL Intravenous PRN GOPAL Alvares MD        tiotropium bromide 2.5 mcg/actuation inhaler 2 puff  2 puff Inhalation Daily GOPAL Alvares MD   2 puff at 04/13/24 0829     Current Outpatient Medications   Medication Sig Dispense Refill    amLODIPine (NORVASC) 5 MG tablet Take 1 tablet by mouth once daily.      aspirin (ECOTRIN) 81 MG EC tablet Take 1 tablet by mouth once daily.      atorvastatin (LIPITOR) 80 MG tablet Take 1 tablet by mouth once daily.      carvediloL (COREG) 12.5 MG tablet Take 1 tablet by mouth 2 (two) times daily with meals.      famotidine (PEPCID) 20 MG tablet Take 1 tablet by mouth once daily.      losartan (COZAAR) 100 MG tablet Take 1 tablet by mouth once daily.      STIOLTO RESPIMAT 2.5-2.5 mcg/actuation Mist Inhale 2 puffs into the lungs once daily.      albuterol (PROVENTIL) 2.5 mg /3 mL (0.083 %) nebulizer solution Take 2.5 mg by nebulization every 6 (six) hours as needed for Wheezing or Shortness of Breath. Rescue      albuterol (VENTOLIN HFA) 90 mcg/actuation inhaler Inhale 2 puffs into the lungs every 6 (six) hours as needed for Wheezing. Rescue      furosemide (LASIX) 40 MG tablet Take 1 tablet (40 mg total) by mouth 2 (two) times daily. (Patient taking differently: Take 40 mg by mouth once daily.) 60 tablet 3    metoprolol succinate (TOPROL-XL) 25 MG 24 hr tablet Take 1 tablet (25 mg total) by mouth once daily. 90 tablet 3     Family History       Problem Relation (Age of Onset)    Heart attack Son          Tobacco Use    Smoking status: Former     Current packs/day: 0.00     Types: Cigarettes     Quit date: 1/1/2014     Years since quitting: 10.2    Smokeless tobacco: Never    Substance and Sexual Activity    Alcohol use: No    Drug use: Not on file    Sexual activity: Not on file     Review of Systems   Constitutional:  Positive for chills and fatigue. Negative for fever.   HENT:  Negative for sore throat and trouble swallowing.    Eyes:  Negative for pain and visual disturbance.   Respiratory:  Positive for cough and shortness of breath.    Cardiovascular:  Positive for chest pain. Negative for palpitations.   Gastrointestinal:  Positive for abdominal pain, constipation, nausea and vomiting.   Genitourinary:  Negative for difficulty urinating and dysuria.   Musculoskeletal:  Negative for arthralgias and myalgias.   Skin:  Negative for rash and wound.   Neurological:  Negative for weakness and numbness.     Objective:     Vital Signs (Most Recent):  Temp: 98.3 °F (36.8 °C) (04/13/24 0334)  Pulse: 78 (04/13/24 0829)  Resp: 16 (04/13/24 0829)  BP: (!) 144/63 (04/13/24 0902)  SpO2: 97 % (04/13/24 0829) Vital Signs (24h Range):  Temp:  [98.3 °F (36.8 °C)] 98.3 °F (36.8 °C)  Pulse:  [65-84] 78  Resp:  [16-28] 16  SpO2:  [96 %-100 %] 97 %  BP: (144-195)/(63-98) 144/63     Weight: 87.1 kg (192 lb)  Body mass index is 38.78 kg/m².     Physical Exam  Vitals and nursing note reviewed.   Constitutional:       General: She is not in acute distress.     Appearance: She is well-developed.   HENT:      Head: Normocephalic and atraumatic.   Eyes:      General:         Right eye: No discharge.         Left eye: No discharge.      Conjunctiva/sclera: Conjunctivae normal.   Cardiovascular:      Rate and Rhythm: Normal rate.      Pulses: Normal pulses.   Pulmonary:      Effort: Pulmonary effort is normal. No respiratory distress.   Chest:      Comments: Tender to palpation along L axilla.  Abdominal:      Palpations: Abdomen is soft.      Tenderness: There is abdominal tenderness (LUQ).   Musculoskeletal:      Right lower leg: No edema.      Left lower leg: No edema.      Comments: L-sided hemiplegia.  Chronic LUE contracture.   Skin:     General: Skin is warm and dry.   Neurological:      Mental Status: She is alert and oriented to person, place, and time.       Significant Labs:   CBC:  Recent Labs   Lab 04/13/24  0345   WBC 8.60   HGB 14.2   HCT 43.4      GRAN 78.8*  6.8   LYMPH 17.7*  1.5   MONO 3.0*  0.3   EOS 0.0   BASO 0.01   CMP:  Recent Labs   Lab 04/13/24  0345   *   K 4.3      CO2 16*   BUN 14   CREATININE 1.2   *   CALCIUM 9.3   ALKPHOS 144*   AST 19   ALT 9*   BILITOT 0.4   PROT 8.1   ALBUMIN 3.6   ANIONGAP 11      Significant Imaging: I have reviewed and interpreted all pertinent imaging results/findings within the past 24 hours.

## 2024-04-13 NOTE — ASSESSMENT & PLAN NOTE
HTN, HLD, h/o stroke  - Continue aspirin 81mg PO daily, atorvastatin 80mg PO daily, carvedilol 12.5mg PO BID, furosemide 40mg PO daily, losartan 100mg PO daily.

## 2024-04-13 NOTE — H&P
[facility] Medicine  History & Physical    Patient Name: Florence Faria  MRN: [id number]  Patient Class: OP- Observation  Admission Date: 4/13/2024  Attending Physician: VICENTE Alvares MD  Primary Care Provider: Tamika Tolbert MD    Patient information was obtained from patient, past medical records, and ER records.     Subjective:     Principal Problem:Closed fracture of one rib of left side    Chief Complaint:   Chief Complaint   Patient presents with    Abdominal Pain     Patient presents to the ED via [facility] with reports of having abdominal pain with constipation x 4 days, nausea, and vomiting. EMS reports on initial assessment patient also having shortness of breath and wheezing. Room air oxygen saturation was 93%. Treated with duoneb en route.     Shortness of Breath        HPI: Ms. Faria is a 73/F with PMH HTN, HLD, CAD, h/o MI, h/o stroke x2 with residual left-sided hemiplegia who presented to [facility] 04/13 with a 2 week history of progressive left-sided chest wall and abdomen pain.  Two weeks prior to presentation she fell onto her left side while getting out of the bathtub; she went to [facility] and was told she had rib contusion and discharged home. Has had continued difficulty getting around her house and has been less mobile as well.  For the four days prior to presentation she had poor PO intake due to her decreased mobility.  She notes intermittent chills and dry cough for the two days prior to presentation with some abdominal discomfort, nausea and vomiting as well as no BM for the four days prior to presentation. With persistent discomfort she contacted EMS and received nebulization en route. In ED, workup was notable for UA with 27 WBCs, many bacteria, nitrite positive, no leukocytosis, CXR with potential L basilar atelectasis, and CT Abd/Pelvis with nonspecific gallbladder distention with mild displaced posterior L 8th rib fracture. She received  ceftriaxone, azithromycin, morphine IV and hospital medicine was contacted for observation.    Past Medical History:   Diagnosis Date    Heart attack     Hyperlipidemia     Hypertension     Stroke      No past surgical history on file.    Review of patient's allergies indicates:  No Known Allergies    Current Facility-Administered Medications   Medication Dose Route Frequency Provider Last Rate Last Admin    acetaminophen tablet 1,000 mg  1,000 mg Oral TID GOPAL Alvares MD        albuterol-ipratropium 2.5 mg-0.5 mg/3 mL nebulizer solution 3 mL  3 mL Nebulization Q4H PRN GOPAL Alvares MD        amLODIPine tablet 5 mg  5 mg Oral Daily GOPAL Alvares MD   5 mg at 04/13/24 0902    aspirin EC tablet 81 mg  81 mg Oral Daily GOPAL Alvares MD   81 mg at 04/13/24 0902    atorvastatin tablet 80 mg  80 mg Oral Daily GOPAL Alvares MD        carvediloL tablet 12.5 mg  12.5 mg Oral BID WM GOPAL Alvares MD   12.5 mg at 04/13/24 0903    [START ON 4/14/2024] cefTRIAXone (Rocephin) 1 g in dextrose 5 % in water (D5W) 100 mL IVPB (MB+)  1 g Intravenous Q24H GOPAL Alvares MD        enoxaparin injection 40 mg  40 mg Subcutaneous Daily GOPAL Alvares MD        famotidine tablet 20 mg  20 mg Oral Daily GOPAL Alvares MD   20 mg at 04/13/24 0903    fluticasone furoate-vilanteroL 100-25 mcg/dose diskus inhaler 1 puff  1 puff Inhalation Daily GOPAL Alvares MD   1 puff at 04/13/24 0829    furosemide tablet 40 mg  40 mg Oral Daily GOPAL Alvares MD   40 mg at 04/13/24 0905    losartan tablet 100 mg  100 mg Oral Daily GOPAL Alvares MD   100 mg at 04/13/24 0904    melatonin tablet 6 mg  6 mg Oral Nightly PRN GOPAL Alvares MD        methocarbamoL tablet 500 mg  500 mg Oral TID PRN GOPAL Alvares MD        morphine injection 2 mg  2 mg Intravenous Q4H PRN GOPAL Alvares MD        morphine injection 4 mg  4 mg Intravenous Q4H PRN GOPAL Alvares MD   4 mg at 04/13/24 0748    ondansetron  disintegrating tablet 4 mg  4 mg Oral Q6H PRN GOPAL Alvares MD        ondansetron injection 4 mg  4 mg Intravenous Q6H PRN GOPAL Alvares MD        polyethylene glycol packet 17 g  17 g Oral BID GOPAL Alvares MD   17 g at 04/13/24 0901    senna-docusate 8.6-50 mg per tablet 1 tablet  1 tablet Oral BID GOPAL Alvares MD   1 tablet at 04/13/24 0902    sodium chloride 0.9% flush 10 mL  10 mL Intravenous PRN GOPAL Alvares MD        tiotropium bromide 2.5 mcg/actuation inhaler 2 puff  2 puff Inhalation Daily GOPAL Alvares MD   2 puff at 04/13/24 0829     Current Outpatient Medications   Medication Sig Dispense Refill    amLODIPine (NORVASC) 5 MG tablet Take 1 tablet by mouth once daily.      aspirin (ECOTRIN) 81 MG EC tablet Take 1 tablet by mouth once daily.      atorvastatin (LIPITOR) 80 MG tablet Take 1 tablet by mouth once daily.      carvediloL (COREG) 12.5 MG tablet Take 1 tablet by mouth 2 (two) times daily with meals.      famotidine (PEPCID) 20 MG tablet Take 1 tablet by mouth once daily.      losartan (COZAAR) 100 MG tablet Take 1 tablet by mouth once daily.      STIOLTO RESPIMAT 2.5-2.5 mcg/actuation Mist Inhale 2 puffs into the lungs once daily.      albuterol (PROVENTIL) 2.5 mg /3 mL (0.083 %) nebulizer solution Take 2.5 mg by nebulization every 6 (six) hours as needed for Wheezing or Shortness of Breath. Rescue      albuterol (VENTOLIN HFA) 90 mcg/actuation inhaler Inhale 2 puffs into the lungs every 6 (six) hours as needed for Wheezing. Rescue      furosemide (LASIX) 40 MG tablet Take 1 tablet (40 mg total) by mouth 2 (two) times daily. (Patient taking differently: Take 40 mg by mouth once daily.) 60 tablet 3    metoprolol succinate (TOPROL-XL) 25 MG 24 hr tablet Take 1 tablet (25 mg total) by mouth once daily. 90 tablet 3     Family History       Problem Relation (Age of Onset)    Heart attack Son          Tobacco Use    Smoking status: Former     Current packs/day: 0.00     Types:  Cigarettes     Quit date: 1/1/2014     Years since quitting: 10.2    Smokeless tobacco: Never   Substance and Sexual Activity    Alcohol use: No    Drug use: Not on file    Sexual activity: Not on file     Review of Systems   Constitutional:  Positive for chills and fatigue. Negative for fever.   HENT:  Negative for sore throat and trouble swallowing.    Eyes:  Negative for pain and visual disturbance.   Respiratory:  Positive for cough and shortness of breath.    Cardiovascular:  Positive for chest pain. Negative for palpitations.   Gastrointestinal:  Positive for abdominal pain, constipation, nausea and vomiting.   Genitourinary:  Negative for difficulty urinating and dysuria.   Musculoskeletal:  Negative for arthralgias and myalgias.   Skin:  Negative for rash and wound.   Neurological:  Negative for weakness and numbness.     Objective:     Vital Signs (Most Recent):  Temp: 98.3 °F (36.8 °C) (04/13/24 0334)  Pulse: 78 (04/13/24 0829)  Resp: 16 (04/13/24 0829)  BP: (!) 144/63 (04/13/24 0902)  SpO2: 97 % (04/13/24 0829) Vital Signs (24h Range):  Temp:  [98.3 °F (36.8 °C)] 98.3 °F (36.8 °C)  Pulse:  [65-84] 78  Resp:  [16-28] 16  SpO2:  [96 %-100 %] 97 %  BP: (144-195)/(63-98) 144/63     Weight: 87.1 kg (192 lb)  Body mass index is 38.78 kg/m².     Physical Exam  Vitals and nursing note reviewed.   Constitutional:       General: She is not in acute distress.     Appearance: She is well-developed.   HENT:      Head: Normocephalic and atraumatic.   Eyes:      General:         Right eye: No discharge.         Left eye: No discharge.      Conjunctiva/sclera: Conjunctivae normal.   Cardiovascular:      Rate and Rhythm: Normal rate.      Pulses: Normal pulses.   Pulmonary:      Effort: Pulmonary effort is normal. No respiratory distress.   Chest:      Comments: Tender to palpation along L axilla.  Abdominal:      Palpations: Abdomen is soft.      Tenderness: There is abdominal tenderness (LUQ).   Musculoskeletal:       Right lower leg: No edema.      Left lower leg: No edema.      Comments: L-sided hemiplegia. Chronic LUE contracture.   Skin:     General: Skin is warm and dry.   Neurological:      Mental Status: She is alert and oriented to person, place, and time.       Significant Labs:   CBC:  Recent Labs   Lab 04/13/24  0345   WBC 8.60   HGB 14.2   HCT 43.4      GRAN 78.8*  6.8   LYMPH 17.7*  1.5   MONO 3.0*  0.3   EOS 0.0   BASO 0.01   CMP:  Recent Labs   Lab 04/13/24  0345   *   K 4.3      CO2 16*   BUN 14   CREATININE 1.2   *   CALCIUM 9.3   ALKPHOS 144*   AST 19   ALT 9*   BILITOT 0.4   PROT 8.1   ALBUMIN 3.6   ANIONGAP 11      Significant Imaging: I have reviewed and interpreted all pertinent imaging results/findings within the past 24 hours.    Assessment/Plan:     * Closed fracture of one rib of left side  Debility, fall, L hemiplegia  - 8th rib fracture likely secondary to recent fall; difficult to identify on CXR but noted on CT Abd/Pelvis.  - Symptomatic management; start incentive spirometry, morphine 2mg IV q4hr PRN, 4mg IV q4hr PRN, methocarbamol 500mg PO TID PRN.  - PT/OT evaluations.    GERD (gastroesophageal reflux disease)  - Continue famotidine 20mg PO daily.    Constipation  - Start polyethylene glycol 17g PO BID, senna-docusate 8.6-50mg PO BID.  - Monitor for BM and adjust as required.    Acute cystitis without hematuria  - UA suggestive of UTI. Monitor cultures.  - Continue ceftriaxone 1g IV q24hr.    Chronic obstructive pulmonary disease  - On [facility] outpatient.  - No strong evidence of acute exacerbation. Lower suspicion for concurrent respiratory infection.  - Continue with fluticasone-vilanterol 100-25mcg inhaled daily, tiotropium 5mcg inhaled daily, albuterol-ipratropium 2.5-0.5mg inhaled q4hr PRN.    Coronary artery disease  HTN, HLD, h/o stroke  - Continue aspirin 81mg PO daily, atorvastatin 80mg PO daily, carvedilol 12.5mg PO BID, furosemide 40mg PO daily, losartan  100mg PO daily.      VTE Risk Mitigation (From admission, onward)           Ordered     enoxaparin injection 40 mg  Daily         04/13/24 0655     IP VTE HIGH RISK PATIENT  Once         04/13/24 0655                       D Aditya Alvares MD  Department of Hospital Medicine  [facility] - Emergency Dept

## 2024-04-13 NOTE — PLAN OF CARE
Problem: Occupational Therapy  Goal: Occupational Therapy Goal  Description: OT goals to be met by 4/27/24  1. Bed mobility needed for participation in EOB/OOB ADL with SBA  2. UB dressing, ivon technique, with Setup/SPV  3. LB dressing, adaptive technique with Min A  4. Functional transfers with LRAD and Min A  Outcome: Ongoing, Progressing     OT orders received, chart reviewed. Evaluation completed, POC established. Evaluation limited this date 2/2 pt with 8/10 pain, attempted sup>sit but pt needing to lay back down immediately 2/2 pain. Pt with L ivon, L UE contracture at baseline 2/2 previous CVA. Will continue to follow to assess EOB/OOB ADL/mobility and progress as tolerated. It appears that pt has been having difficulty caring for herself at home, has some assist from family and is awaiting approval of caregiver from insurance. DC and DME recs pending further assessment of EOB/OOB ADL/mobility.

## 2024-04-13 NOTE — Clinical Note
Diagnosis: Acute cystitis without hematuria [831803]   Future Attending Provider: GOPAL OSBORNE [[id number]]

## 2024-04-13 NOTE — ASSESSMENT & PLAN NOTE
Debility, fall, L hemiplegia  - 8th rib fracture likely secondary to recent fall; difficult to identify on CXR but noted on CT Abd/Pelvis.  - Symptomatic management; start incentive spirometry, morphine 2mg IV q4hr PRN, 4mg IV q4hr PRN, methocarbamol 500mg PO TID PRN.  - PT/OT evaluations.

## 2024-04-13 NOTE — ASSESSMENT & PLAN NOTE
- On [facility] outpatient.  - No strong evidence of acute exacerbation. Lower suspicion for concurrent respiratory infection.  - Continue with fluticasone-vilanterol 100-25mcg inhaled daily, tiotropium 5mcg inhaled daily, albuterol-ipratropium 2.5-0.5mg inhaled q4hr PRN.

## 2024-04-13 NOTE — PLAN OF CARE
Problem: Adjustment to Illness COPD (Chronic Obstructive Pulmonary Disease)  Goal: Optimal Chronic Illness Coping  Outcome: Ongoing, Progressing     Problem: Respiratory Compromise COPD (Chronic Obstructive Pulmonary Disease)  Goal: Effective Oxygenation and Ventilation  Outcome: Ongoing, Progressing     Problem: Adult Inpatient Plan of Care  Goal: Optimal Comfort and Wellbeing  Outcome: Ongoing, Progressing

## 2024-04-13 NOTE — PLAN OF CARE
Problem: Physical Therapy  Goal: Physical Therapy Goal  Description: ASSESS OUT OF BED MOBILITY and develop appropriate goals  Outcome: Ongoing, Progressing     Pt presents with left hemiplegia has 2/5 in left dorsiflexors unable to assess more proximal muscles of leg today secondary to pain. Obtained history with OT but once moving patient she started having abdominal pain and asked to defer mobility until tomorrow. For this reason, defer all recs until tomorrow.

## 2024-04-14 LAB
ANION GAP SERPL CALC-SCNC: 8 MMOL/L (ref 8–16)
BASOPHILS # BLD AUTO: 0.01 K/UL (ref 0–0.2)
BASOPHILS NFR BLD: 0.1 % (ref 0–1.9)
BUN SERPL-MCNC: 15 MG/DL (ref 8–23)
CALCIUM SERPL-MCNC: 9.6 MG/DL (ref 8.7–10.5)
CHLORIDE SERPL-SCNC: 103 MMOL/L (ref 95–110)
CO2 SERPL-SCNC: 26 MMOL/L (ref 23–29)
CREAT SERPL-MCNC: 1.4 MG/DL (ref 0.5–1.4)
DIFFERENTIAL METHOD BLD: ABNORMAL
EOSINOPHIL # BLD AUTO: 0 K/UL (ref 0–0.5)
EOSINOPHIL NFR BLD: 0.3 % (ref 0–8)
ERYTHROCYTE [DISTWIDTH] IN BLOOD BY AUTOMATED COUNT: 14.1 % (ref 11.5–14.5)
EST. GFR  (NO RACE VARIABLE): 39 ML/MIN/1.73 M^2
GLUCOSE SERPL-MCNC: 96 MG/DL (ref 70–110)
HCT VFR BLD AUTO: 47 % (ref 37–48.5)
HGB BLD-MCNC: 14.9 G/DL (ref 12–16)
IMM GRANULOCYTES # BLD AUTO: 0.03 K/UL (ref 0–0.04)
IMM GRANULOCYTES NFR BLD AUTO: 0.4 % (ref 0–0.5)
LYMPHOCYTES # BLD AUTO: 2.5 K/UL (ref 1–4.8)
LYMPHOCYTES NFR BLD: 36.6 % (ref 18–48)
MAGNESIUM SERPL-MCNC: 2.1 MG/DL (ref 1.6–2.6)
MCH RBC QN AUTO: 30 PG (ref 27–31)
MCHC RBC AUTO-ENTMCNC: 31.7 G/DL (ref 32–36)
MCV RBC AUTO: 95 FL (ref 82–98)
MONOCYTES # BLD AUTO: 0.6 K/UL (ref 0.3–1)
MONOCYTES NFR BLD: 9.4 % (ref 4–15)
NEUTROPHILS # BLD AUTO: 3.6 K/UL (ref 1.8–7.7)
NEUTROPHILS NFR BLD: 53.2 % (ref 38–73)
NRBC BLD-RTO: 0 /100 WBC
PHOSPHATE SERPL-MCNC: 4 MG/DL (ref 2.7–4.5)
PLATELET # BLD AUTO: 182 K/UL (ref 150–450)
PMV BLD AUTO: 10.6 FL (ref 9.2–12.9)
POTASSIUM SERPL-SCNC: 4.3 MMOL/L (ref 3.5–5.1)
RBC # BLD AUTO: 4.97 M/UL (ref 4–5.4)
SODIUM SERPL-SCNC: 137 MMOL/L (ref 136–145)
WBC # BLD AUTO: 6.73 K/UL (ref 3.9–12.7)

## 2024-04-14 PROCEDURE — 25000242 PHARM REV CODE 250 ALT 637 W/ HCPCS: Performed by: INTERNAL MEDICINE

## 2024-04-14 PROCEDURE — 96372 THER/PROPH/DIAG INJ SC/IM: CPT | Performed by: INTERNAL MEDICINE

## 2024-04-14 PROCEDURE — 96366 THER/PROPH/DIAG IV INF ADDON: CPT

## 2024-04-14 PROCEDURE — 63600175 PHARM REV CODE 636 W HCPCS: Performed by: INTERNAL MEDICINE

## 2024-04-14 PROCEDURE — 97535 SELF CARE MNGMENT TRAINING: CPT

## 2024-04-14 PROCEDURE — 27000221 HC OXYGEN, UP TO 24 HOURS

## 2024-04-14 PROCEDURE — 85025 COMPLETE CBC W/AUTO DIFF WBC: CPT | Performed by: INTERNAL MEDICINE

## 2024-04-14 PROCEDURE — 11000001 HC ACUTE MED/SURG PRIVATE ROOM

## 2024-04-14 PROCEDURE — 84100 ASSAY OF PHOSPHORUS: CPT | Performed by: INTERNAL MEDICINE

## 2024-04-14 PROCEDURE — 94761 N-INVAS EAR/PLS OXIMETRY MLT: CPT

## 2024-04-14 PROCEDURE — 96376 TX/PRO/DX INJ SAME DRUG ADON: CPT

## 2024-04-14 PROCEDURE — 25000003 PHARM REV CODE 250: Performed by: INTERNAL MEDICINE

## 2024-04-14 PROCEDURE — 36415 COLL VENOUS BLD VENIPUNCTURE: CPT | Performed by: INTERNAL MEDICINE

## 2024-04-14 PROCEDURE — 83735 ASSAY OF MAGNESIUM: CPT | Performed by: INTERNAL MEDICINE

## 2024-04-14 PROCEDURE — 36410 VNPNXR 3YR/> PHY/QHP DX/THER: CPT

## 2024-04-14 PROCEDURE — 97530 THERAPEUTIC ACTIVITIES: CPT

## 2024-04-14 PROCEDURE — 94640 AIRWAY INHALATION TREATMENT: CPT | Mod: XB

## 2024-04-14 PROCEDURE — 80048 BASIC METABOLIC PNL TOTAL CA: CPT | Performed by: INTERNAL MEDICINE

## 2024-04-14 PROCEDURE — C1751 CATH, INF, PER/CENT/MIDLINE: HCPCS

## 2024-04-14 PROCEDURE — 94799 UNLISTED PULMONARY SVC/PX: CPT | Mod: XB

## 2024-04-14 RX ORDER — SODIUM CHLORIDE 0.9 % (FLUSH) 0.9 %
10 SYRINGE (ML) INJECTION
Status: DISCONTINUED | OUTPATIENT
Start: 2024-04-14 | End: 2024-04-17 | Stop reason: HOSPADM

## 2024-04-14 RX ORDER — MORPHINE SULFATE 2 MG/ML
2 INJECTION, SOLUTION INTRAMUSCULAR; INTRAVENOUS EVERY 4 HOURS PRN
Status: DISCONTINUED | OUTPATIENT
Start: 2024-04-14 | End: 2024-04-17 | Stop reason: HOSPADM

## 2024-04-14 RX ORDER — BISACODYL 5 MG
10 TABLET, DELAYED RELEASE (ENTERIC COATED) ORAL DAILY
Status: DISCONTINUED | OUTPATIENT
Start: 2024-04-14 | End: 2024-04-17 | Stop reason: HOSPADM

## 2024-04-14 RX ORDER — OXYCODONE HYDROCHLORIDE 5 MG/1
5 TABLET ORAL EVERY 4 HOURS PRN
Status: DISCONTINUED | OUTPATIENT
Start: 2024-04-14 | End: 2024-04-17 | Stop reason: HOSPADM

## 2024-04-14 RX ORDER — OXYCODONE HYDROCHLORIDE 10 MG/1
10 TABLET ORAL EVERY 4 HOURS PRN
Status: DISCONTINUED | OUTPATIENT
Start: 2024-04-14 | End: 2024-04-17 | Stop reason: HOSPADM

## 2024-04-14 RX ORDER — SODIUM CHLORIDE 0.9 % (FLUSH) 0.9 %
10 SYRINGE (ML) INJECTION EVERY 6 HOURS
Status: DISCONTINUED | OUTPATIENT
Start: 2024-04-15 | End: 2024-04-17 | Stop reason: HOSPADM

## 2024-04-14 RX ADMIN — TIOTROPIUM BROMIDE INHALATION SPRAY 2 PUFF: 3.12 SPRAY, METERED RESPIRATORY (INHALATION) at 08:04

## 2024-04-14 RX ADMIN — ASPIRIN 81 MG: 81 TABLET, COATED ORAL at 08:04

## 2024-04-14 RX ADMIN — CARVEDILOL 12.5 MG: 12.5 TABLET, FILM COATED ORAL at 05:04

## 2024-04-14 RX ADMIN — MORPHINE SULFATE 4 MG: 4 INJECTION, SOLUTION INTRAMUSCULAR; INTRAVENOUS at 05:04

## 2024-04-14 RX ADMIN — ACETAMINOPHEN 1000 MG: 500 TABLET ORAL at 01:04

## 2024-04-14 RX ADMIN — SENNOSIDES AND DOCUSATE SODIUM 1 TABLET: 8.6; 5 TABLET ORAL at 08:04

## 2024-04-14 RX ADMIN — CARVEDILOL 12.5 MG: 12.5 TABLET, FILM COATED ORAL at 08:04

## 2024-04-14 RX ADMIN — ACETAMINOPHEN 1000 MG: 500 TABLET ORAL at 08:04

## 2024-04-14 RX ADMIN — LOSARTAN POTASSIUM 100 MG: 50 TABLET, FILM COATED ORAL at 08:04

## 2024-04-14 RX ADMIN — OXYCODONE 5 MG: 5 TABLET ORAL at 08:04

## 2024-04-14 RX ADMIN — ENOXAPARIN SODIUM 40 MG: 40 INJECTION SUBCUTANEOUS at 05:04

## 2024-04-14 RX ADMIN — FUROSEMIDE 40 MG: 20 TABLET ORAL at 08:04

## 2024-04-14 RX ADMIN — POLYETHYLENE GLYCOL 3350 17 G: 17 POWDER, FOR SOLUTION ORAL at 08:04

## 2024-04-14 RX ADMIN — FLUTICASONE FUROATE AND VILANTEROL TRIFENATATE 1 PUFF: 100; 25 POWDER RESPIRATORY (INHALATION) at 08:04

## 2024-04-14 RX ADMIN — OXYCODONE 5 MG: 5 TABLET ORAL at 12:04

## 2024-04-14 RX ADMIN — AMLODIPINE BESYLATE 5 MG: 5 TABLET ORAL at 08:04

## 2024-04-14 RX ADMIN — CEFTRIAXONE SODIUM 1 G: 1 INJECTION, POWDER, FOR SOLUTION INTRAMUSCULAR; INTRAVENOUS at 05:04

## 2024-04-14 RX ADMIN — BISACODYL 10 MG: 5 TABLET, COATED ORAL at 11:04

## 2024-04-14 RX ADMIN — FAMOTIDINE 20 MG: 20 TABLET ORAL at 08:04

## 2024-04-14 RX ADMIN — ONDANSETRON 4 MG: 4 TABLET, ORALLY DISINTEGRATING ORAL at 07:04

## 2024-04-14 RX ADMIN — ATORVASTATIN CALCIUM 80 MG: 20 TABLET, FILM COATED ORAL at 08:04

## 2024-04-14 NOTE — PLAN OF CARE
Patient AAOX3, patient wheelchair bound at baseline. Home DME-power chair, heavy duty RW. PCP updated in system. Patient lives alone but receives some assistance from daughter Emily Jones a few days a week. Emily stated mom will be receiving long term acute care services (to assist with bathing, cleaning, cooking) from [facility] soon. Patient is unable to care for self at home CM introduced NH placement with daughter who is totally against that idea stating mom refuses and in the past has refused anyone coming into the home to provide assistance although she is more open to it now. Will require transportation home.    04/14/24 0919   Discharge Assessment   Assessment Type Discharge Planning Assessment   Confirmed/corrected address, phone number and insurance Yes   Confirmed Demographics Correct on Facesheet   Source of Information patient;family   Communicated RAVINDRA with patient/caregiver Date not available/Unable to determine   People in Home alone   Do you expect to return to your current living situation? Yes   Prior to hospitilization cognitive status: Alert/Oriented   Current cognitive status: Alert/Oriented   Walking or Climbing Stairs Difficulty yes   Walking or Climbing Stairs ambulation difficulty, requires equipment   Dressing/Bathing Difficulty yes   Dressing/Bathing bathing difficulty, assistance 1 person   Equipment Currently Used at Home walker, rolling;power chair   Readmission within 30 days? No   Do you take prescription medications? Yes   Do you have prescription coverage? Yes   Do you have any problems affording any of your prescribed medications? No   Is the patient taking medications as prescribed? yes   Who is going to help you get home at discharge? Emily Jones (Daughter)  [phone] (Home Phone)   How do you get to doctors appointments? other (see comments)  (RTA Lyft)   Are you on dialysis? No   Do you take coumadin? No   Discharge Plan A Home Health   Discharge Plan B Home with family   DME  Needed Upon Discharge  none   Discharge Plan discussed with: Patient;Adult children   Transition of Care Barriers None   Physical Activity   On average, how many days per week do you engage in moderate to strenuous exercise (like a brisk walk)? 0 days   On average, how many minutes do you engage in exercise at this level? 0 min   Financial Resource Strain   How hard is it for you to pay for the very basics like food, housing, medical care, and heating? Hard   Housing Stability   In the last 12 months, was there a time when you were not able to pay the mortgage or rent on time? Y   At any time in the past 12 months, were you homeless or living in a shelter (including now)? N   Transportation Needs   In the past 12 months, has lack of transportation kept you from medical appointments or from getting medications? no   In the past 12 months, has lack of transportation kept you from meetings, work, or from getting things needed for daily living? No   Food Insecurity   Within the past 12 months, you worried that your food would run out before you got the money to buy more. Never true   Within the past 12 months, the food you bought just didn't last and you didn't have money to get more. Never true   Stress   Do you feel stress - tense, restless, nervous, or anxious, or unable to sleep at night because your mind is troubled all the time - these days? Not at all   Social Connections   In a typical week, how many times do you talk on the phone with family, friends, or neighbors? More than 3   How often do you get together with friends or relatives? More than 3   How often do you attend Confucianist or Sabianism services? More than 4   Do you belong to any clubs or organizations such as Confucianist groups, unions, fraternal or athletic groups, or school groups? No   How often do you attend meetings of the clubs or organizations you belong to? Never   Are you , , , , never , or living with a partner?  Never marrie   Alcohol Use   Q1: How often do you have a drink containing alcohol? Never   Q2: How many drinks containing alcohol do you have on a typical day when you are drinking? None   Q3: How often do you have six or more drinks on one occasion? Never     Buddhist - Med Surg ([address])  Initial Discharge Assessment       Primary Care Provider: Tamika Tolbert MD    Admission Diagnosis: Dyspnea [R06.00]  Acute cystitis without hematuria [N30.00]  Abdominal pain [R10.9]  Constipation, unspecified constipation type [K59.00]    Admission Date: 4/13/2024  Expected Discharge Date:     Transition of Care Barriers: (P) None    Payor: [employer] / Plan: [employer] / Product Type: Medicare Advantage /     Extended Emergency Contact Information  Primary Emergency Contact: Emily Jones   [facility]  Home Phone: [phone]  Relation: Daughter    Discharge Plan A: (P) Home Health  Discharge Plan B: (P) Home with family    No Pharmacies Listed    Initial Assessment (most recent)       Adult Discharge Assessment - 04/14/24 0919          Discharge Assessment    Assessment Type Discharge Planning Assessment (P)      Confirmed/corrected address, phone number and insurance Yes (P)      Confirmed Demographics Correct on Facesheet (P)      Source of Information patient;family (P)      Communicated RAVINDRA with patient/caregiver Date not available/Unable to determine (P)      People in Home alone (P)      Do you expect to return to your current living situation? Yes (P)      Prior to hospitilization cognitive status: Alert/Oriented (P)      Current cognitive status: Alert/Oriented (P)      Walking or Climbing Stairs Difficulty yes (P)      Walking or Climbing Stairs ambulation difficulty, requires equipment (P)      Dressing/Bathing Difficulty yes (P)      Dressing/Bathing bathing difficulty, assistance 1 person (P)      Equipment Currently Used at Home walker, rolling;power chair (P)       Readmission within 30 days? No (P)      Do you take prescription medications? Yes (P)      Do you have prescription coverage? Yes (P)      Do you have any problems affording any of your prescribed medications? No (P)      Is the patient taking medications as prescribed? yes (P)      Who is going to help you get home at discharge? Emily Jones (Daughter)  [phone] (Home Phone) (P)      How do you get to doctors appointments? other (see comments) (P)    RTA Lyft    Are you on dialysis? No (P)      Do you take coumadin? No (P)      Discharge Plan A Home Health (P)      Discharge Plan B Home with family (P)      DME Needed Upon Discharge  none (P)      Discharge Plan discussed with: Patient;Adult children (P)      Transition of Care Barriers None (P)         Physical Activity    On average, how many days per week do you engage in moderate to strenuous exercise (like a brisk walk)? 0 days (P)      On average, how many minutes do you engage in exercise at this level? 0 min (P)         Financial Resource Strain    How hard is it for you to pay for the very basics like food, housing, medical care, and heating? Hard (P)         Housing Stability    In the last 12 months, was there a time when you were not able to pay the mortgage or rent on time? Yes (P)      At any time in the past 12 months, were you homeless or living in a shelter (including now)? No (P)         Transportation Needs    In the past 12 months, has lack of transportation kept you from medical appointments or from getting medications? No (P)      In the past 12 months, has lack of transportation kept you from meetings, work, or from getting things needed for daily living? No (P)         Food Insecurity    Within the past 12 months, you worried that your food would run out before you got the money to buy more. Never true (P)      Within the past 12 months, the food you bought just didn't last and you didn't have money to get more. Never true (P)          Stress    Do you feel stress - tense, restless, nervous, or anxious, or unable to sleep at night because your mind is troubled all the time - these days? Not at all (P)         Social Connections    In a typical week, how many times do you talk on the phone with family, friends, or neighbors? More than three times a week (P)      How often do you get together with friends or relatives? More than three times a week (P)      How often do you attend Amish or Protestant services? More than 4 times per year (P)      Do you belong to any clubs or organizations such as Amish groups, unions, fraternal or athletic groups, or school groups? No (P)      How often do you attend meetings of the clubs or organizations you belong to? Never (P)      Are you , , , , never , or living with a partner? Never  (P)         Alcohol Use    Q1: How often do you have a drink containing alcohol? Never (P)      Q2: How many drinks containing alcohol do you have on a typical day when you are drinking? Patient does not drink (P)      Q3: How often do you have six or more drinks on one occasion? Never (P)

## 2024-04-14 NOTE — PLAN OF CARE
Problem: Adult Inpatient Plan of Care  Goal: Plan of Care Review  Outcome: Ongoing, Progressing  Goal: Patient-Specific Goal (Individualized)  Outcome: Ongoing, Progressing  Goal: Absence of Hospital-Acquired Illness or Injury  Outcome: Ongoing, Progressing  Goal: Optimal Comfort and Wellbeing  Outcome: Ongoing, Progressing  Goal: Readiness for Transition of Care  Outcome: Ongoing, Progressing     Problem: Adjustment to Illness COPD (Chronic Obstructive Pulmonary Disease)  Goal: Optimal Chronic Illness Coping  Outcome: Ongoing, Progressing

## 2024-04-14 NOTE — PLAN OF CARE
CM met at bedside to discuss therapy recs of SNF placement. While very reluctant intially she understands that is needs to get stronger and is willing to go. Stated [place] and hope it wont be like that again. CM spoke with daughter Emily who is in agreement as well.     [facility] to be called in Monday am. Referrals sent in [facility].

## 2024-04-14 NOTE — PT/OT/SLP PROGRESS
Physical Therapy Treatment    Patient Name:  Florence Faria   MRN:  [id number]    Recommendations:     Discharge Recommendations: Moderate Intensity Therapy  Discharge Equipment Recommendations: to be determined by next level of care  Barriers to discharge: Inaccessible home and Decreased caregiver support    Assessment:     Florence Faria is a 73 y.o. female admitted with a medical diagnosis of Closed fracture of one rib of left side.  She presents with the following impairments/functional limitations: weakness, impaired endurance, impaired sensation, impaired self care skills, impaired functional mobility, gait instability, impaired balance, decreased coordination, decreased upper extremity function, decreased lower extremity function, decreased safety awareness, pain, abnormal tone, decreased ROM, impaired coordination, impaired fine motor, impaired cardiopulmonary response to activity, impaired joint extensibility, impaired muscle length. Pt required Min assist to transfer supine to sit.  She also required Min assist to transfer sit to stand without an assistive device.  She attempted to take a step with min assist and assist to advance her L LE, but was not able to secondary to L side pain.  Pt required Mod assist to transfer sit to supine.  She required mod assist to scoot back in be in sitting and Max assist of two to scoot up in bed in supine. .    Rehab Prognosis: Fair; patient would benefit from acute skilled PT services to address these deficits and reach maximum level of function.    Recent Surgery: * No surgery found *      Plan:     During this hospitalization, patient to be seen 5 x/week to address the identified rehab impairments via gait training, therapeutic activities, therapeutic exercises, neuromuscular re-education, wheelchair management/training and progress toward the following goals:    Plan of Care Expires:  05/14/24    Subjective     Chief Complaint: L rib pain  Patient/Family  Comments/goals: none stated  Pain/Comfort:  Pain Rating 1: 8/10  Location - Side 1: Left  Location - Orientation 1: midline  Location 1: rib(s)  Pain Addressed 1: Distraction, Cessation of Activity  Pain Rating Post-Intervention 1: other (see comments) (none reported)      Objective:     Communicated with Nurse prior to session.  Patient found right sidelying with peripheral IV, PureWick, telemetry, bed alarm upon PT entry to room.     General Precautions: Standard, fall  Orthopedic Precautions: N/A  Braces: N/A  Respiratory Status: Room air     Functional Mobility:  Bed Mobility:     Supine to Sit: minimum assistance  Sit to Supine: moderate assistance  Transfers:     Sit to Stand:  minimum assistance with no AD      AM-PAC 6 CLICK MOBILITY  Turning over in bed (including adjusting bedclothes, sheets and blankets)?: 2  Sitting down on and standing up from a chair with arms (e.g., wheelchair, bedside commode, etc.): 2  Moving from lying on back to sitting on the side of the bed?: 2  Moving to and from a bed to a chair (including a wheelchair)?: 2  Need to walk in hospital room?: 1  Climbing 3-5 steps with a railing?: 1  Basic Mobility Total Score: 10       Treatment & Education:  Plan of Care  Transfer training    Patient left right sidelying with all lines intact, call button in reach, bed alarm on, and Nurse notified..    GOALS:   Multidisciplinary Problems       Physical Therapy Goals          Problem: Physical Therapy    Goal Priority Disciplines Outcome Goal Variances Interventions   Physical Therapy Goal     PT, PT/OT Ongoing, Progressing     Description: Goals to be met by: 2024    Patient will increase functional independence with mobility by performin. Sit<>stand with SBA with Darnell walker.  2. Pivot transfer Bed<>WC with SBA.  3. Transfer supine<>sit with CGA                          Time Tracking:     PT Received On: 24  PT Start Time: 1058     PT Stop Time: 1118  PT Total Time (min): 20  min     Billable Minutes: Therapeutic Activity 20    Treatment Type: Treatment  PT/PTA: PT     Number of PTA visits since last PT visit: 0     04/14/2024

## 2024-04-14 NOTE — PLAN OF CARE
Problem: Occupational Therapy  Goal: Occupational Therapy Goal  Description: OT goals to be met by 4/27/24  1. Bed mobility needed for participation in EOB/OOB ADL with SBA  2. UB dressing, ivon technique, with Setup/SPV  3. LB dressing, adaptive technique with Min A  4. Functional transfers with LRAD and Min A  Outcome: Ongoing, Progressing     Able to assess EOB/OOB activity this date. Pt unable to complete stand pivot bed>w/c, even with assist of two, 2/2 pain and pt requiring Setup-Mod A for assessed ADL.   Discharge rec: moderate intensity therapy  DME rec: TBD at next level of care - pt has ivon walker, BSC, tub bench, w/c, and grab bars

## 2024-04-14 NOTE — PROGRESS NOTES
[facility] Surg ([address] Medicine  Progress Note    Patient Name: Florence Faria  MRN: [id number]  Patient Class: OP- Observation   Admission Date: 4/13/2024  Length of Stay: 0 days  Attending Physician: GOPAL Alvares MD  Primary Care Provider: [facility]        Subjective:     Principal Problem:Closed fracture of one rib of left side        HPI:  Ms. Faria is a 73/F with PMH HTN, HLD, CAD, h/o MI, h/o stroke x2 with residual left-sided hemiplegia who presented to [facility] 04/13 with a 2 week history of progressive left-sided chest wall and abdomen pain.  Two weeks prior to presentation she fell onto her left side while getting out of the bathtub; she went to [facility] and was told she had rib contusion and discharged home. Has had continued difficulty getting around her house and has been less mobile as well.  For the four days prior to presentation she had poor PO intake due to her decreased mobility.  She notes intermittent chills and dry cough for the two days prior to presentation with some abdominal discomfort, nausea and vomiting as well as no BM for the four days prior to presentation. With persistent discomfort she contacted EMS and received nebulization en route. In ED, workup was notable for UA with 27 WBCs, many bacteria, nitrite positive, no leukocytosis, CXR with potential L basilar atelectasis, and CT Abd/Pelvis with nonspecific gallbladder distention with mild displaced posterior L 8th rib fracture. She received ceftriaxone, azithromycin, morphine IV and hospital medicine was contacted for observation.    Overview/Hospital Course:  No notes on file    Interval History: No acute events overnight. SOB and chest pain doing better with pain medications but still prominent. No BM so far. Discussed plan of care. No other concerns at this time.    Review of Systems   Constitutional:  Negative for chills and fever.   Respiratory:  Positive for shortness  of breath. Negative for cough.    Cardiovascular:  Positive for chest pain. Negative for palpitations.   Gastrointestinal:  Negative for abdominal pain, nausea and vomiting.     Objective:     Vital Signs (Most Recent):  Temp: 98.2 °F (36.8 °C) (04/14/24 1229)  Pulse: 67 (04/14/24 1229)  Resp: 18 (04/14/24 1253)  BP: 122/66 (04/14/24 1229)  SpO2: (!) 91 % (04/14/24 1229) Vital Signs (24h Range):  Temp:  [97.3 °F (36.3 °C)-98.4 °F (36.9 °C)] 98.2 °F (36.8 °C)  Pulse:  [62-77] 67  Resp:  [12-20] 18  SpO2:  [91 %-98 %] 91 %  BP: (122-179)/(62-85) 122/66     Weight: 87.1 kg (192 lb)  Body mass index is 38.78 kg/m².    Intake/Output Summary (Last 24 hours) at 4/14/2024 1303  Last data filed at 4/14/2024 0714  Gross per 24 hour   Intake 97.4 ml   Output 250 ml   Net -152.6 ml         Physical Exam  Vitals and nursing note reviewed.   Constitutional:       General: She is not in acute distress.     Appearance: She is well-developed.   HENT:      Head: Normocephalic and atraumatic.   Eyes:      General:         Right eye: No discharge.         Left eye: No discharge.      Conjunctiva/sclera: Conjunctivae normal.   Cardiovascular:      Rate and Rhythm: Normal rate.      Pulses: Normal pulses.   Pulmonary:      Effort: Pulmonary effort is normal. No respiratory distress.   Chest:      Comments: Tender to palpation along L axilla.  Abdominal:      Palpations: Abdomen is soft.      Tenderness: There is abdominal tenderness (LUQ).   Musculoskeletal:      Right lower leg: No edema.      Left lower leg: No edema.      Comments: L-sided hemiplegia. Chronic LUE contracture.   Skin:     General: Skin is warm and dry.   Neurological:      Mental Status: She is alert and oriented to person, place, and time.      Comments: Hard of hearing. Chronic dysarthria.         Significant Labs:   CBC:  Recent Labs   Lab 04/13/24  0345 04/14/24  0549   WBC 8.60 6.73   HGB 14.2 14.9   HCT 43.4 47.0    182   GRAN 78.8*  6.8 53.2  3.6    LYMPH 17.7*  1.5 36.6  2.5   MONO 3.0*  0.3 9.4  0.6   EOS 0.0 0.0   BASO 0.01 0.01   BMP:  Recent Labs   Lab 04/13/24  0345 04/14/24  0549   * 137   K 4.3 4.3    103   CO2 16* 26   BUN 14 15   CREATININE 1.2 1.4   * 96   CALCIUM 9.3 9.6   MG  --  2.1   PHOS  --  4.0     Significant Imaging: I have reviewed and interpreted all pertinent imaging results/findings within the past 24 hours.      Assessment/Plan:      * Closed fracture of one rib of left side  Debility, fall, L hemiplegia  - 8th rib fracture likely secondary to recent fall; difficult to identify on CXR but noted on CT Abd/Pelvis.  - Symptomatic management; start incentive spirometry, morphine 2mg IV q4hr PRN, 4mg IV q4hr PRN, methocarbamol 500mg PO TID PRN.  - PT/OT evaluations.    GERD (gastroesophageal reflux disease)  - Continue famotidine 20mg PO daily.    Constipation  - Start polyethylene glycol 17g PO BID, senna-docusate 8.6-50mg PO BID.  - Monitor for BM and adjust as required.    Acute cystitis without hematuria  - UA suggestive of UTI. Monitor cultures.  - Continue ceftriaxone 1g IV q24hr.    Chronic obstructive pulmonary disease  - On [facility] outpatient.  - No strong evidence of acute exacerbation. Lower suspicion for concurrent respiratory infection.  - Continue with fluticasone-vilanterol 100-25mcg inhaled daily, tiotropium 5mcg inhaled daily, albuterol-ipratropium 2.5-0.5mg inhaled q4hr PRN.    Coronary artery disease  HTN, HLD, h/o stroke  - Continue aspirin 81mg PO daily, atorvastatin 80mg PO daily, carvedilol 12.5mg PO BID, furosemide 40mg PO daily, losartan 100mg PO daily.      VTE Risk Mitigation (From admission, onward)           Ordered     enoxaparin injection 40 mg  Daily         04/13/24 0655     IP VTE HIGH RISK PATIENT  Once         04/13/24 0655                    Discharge Planning   RAVINDRA:      Code Status: Full Code   Is the patient medically ready for discharge?:     Reason for patient still in  hospital (select all that apply): Treatment  Discharge Plan A: Home Health                  D Aditya Alvares MD  Department of Hospital Medicine   [facility] Surg ([address])

## 2024-04-14 NOTE — PLAN OF CARE
Inpatient Upgrade Note    Florence Faria has warranted treatment spanning two or more midnights of hospital level care for the management of  73/F with PMH HTN, HLD, CAD, h/o MI, h/o stroke x2 with residual left-sided hemiplegia who presented to [facility] 04/13 with a 2 week history of progressive left-sided chest wall and abdomen pain.  Two weeks prior to presentation she fell onto her left side while getting out of the bathtub; she went to [facility] and was told she had rib contusion and discharged home. Has had continued difficulty getting around her house and has been less mobile as well.  For the four days prior to presentation she had poor PO intake due to her decreased mobility.  She notes intermittent chills and dry cough for the two days prior to presentation with some abdominal discomfort, nausea and vomiting as well as no BM for the four days prior to presentation. With persistent discomfort she contacted EMS and received nebulization en route. In ED, workup was notable for UA with 27 WBCs, many bacteria, nitrite positive, no leukocytosis, CXR with potential L basilar atelectasis, and CT Abd/Pelvis with nonspecific gallbladder distention with mild displaced posterior L 8th rib fracture. She received ceftriaxone, azithromycin, morphine IV  . She continues to require IV antibiotics, further testing/imaging, monitoring of vital signs, and further evaluation by consultants. Her condition is also complicated by the following comorbidities: Hypertension and Chronic respiratory disease.

## 2024-04-14 NOTE — PLAN OF CARE
Problem: Physical Therapy  Goal: Physical Therapy Goal  Description: Goals to be met by: 2024    Patient will increase functional independence with mobility by performin. Sit<>stand with SBA with Darnell walker.  2. Pivot transfer Bed<>WC with SBA.  3. Transfer supine<>sit with CGA     Outcome: Ongoing, Progressing   Pt required Min assist to transfer supine to sit.  She also required Min assist to transfer sit to stand without an assistive device.  She attempted to take a step with min assist and assist to advance her L LE, but was not able to secondary to L side pain.  Pt required Mod assist to transfer sit to supine.  She required mod assist to scoot back in be in sitting and Max assist of two to scoot up in bed in supine.

## 2024-04-14 NOTE — SUBJECTIVE & OBJECTIVE
Interval History: No acute events overnight. SOB and chest pain doing better with pain medications but still prominent. No BM so far. Discussed plan of care. No other concerns at this time.    Review of Systems   Constitutional:  Negative for chills and fever.   Respiratory:  Positive for shortness of breath. Negative for cough.    Cardiovascular:  Positive for chest pain. Negative for palpitations.   Gastrointestinal:  Negative for abdominal pain, nausea and vomiting.     Objective:     Vital Signs (Most Recent):  Temp: 98.2 °F (36.8 °C) (04/14/24 1229)  Pulse: 67 (04/14/24 1229)  Resp: 18 (04/14/24 1253)  BP: 122/66 (04/14/24 1229)  SpO2: (!) 91 % (04/14/24 1229) Vital Signs (24h Range):  Temp:  [97.3 °F (36.3 °C)-98.4 °F (36.9 °C)] 98.2 °F (36.8 °C)  Pulse:  [62-77] 67  Resp:  [12-20] 18  SpO2:  [91 %-98 %] 91 %  BP: (122-179)/(62-85) 122/66     Weight: 87.1 kg (192 lb)  Body mass index is 38.78 kg/m².    Intake/Output Summary (Last 24 hours) at 4/14/2024 1303  Last data filed at 4/14/2024 0714  Gross per 24 hour   Intake 97.4 ml   Output 250 ml   Net -152.6 ml         Physical Exam  Vitals and nursing note reviewed.   Constitutional:       General: She is not in acute distress.     Appearance: She is well-developed.   HENT:      Head: Normocephalic and atraumatic.   Eyes:      General:         Right eye: No discharge.         Left eye: No discharge.      Conjunctiva/sclera: Conjunctivae normal.   Cardiovascular:      Rate and Rhythm: Normal rate.      Pulses: Normal pulses.   Pulmonary:      Effort: Pulmonary effort is normal. No respiratory distress.   Chest:      Comments: Tender to palpation along L axilla.  Abdominal:      Palpations: Abdomen is soft.      Tenderness: There is abdominal tenderness (LUQ).   Musculoskeletal:      Right lower leg: No edema.      Left lower leg: No edema.      Comments: L-sided hemiplegia. Chronic LUE contracture.   Skin:     General: Skin is warm and dry.   Neurological:       Mental Status: She is alert and oriented to person, place, and time.      Comments: Hard of hearing. Chronic dysarthria.         Significant Labs:   CBC:  Recent Labs   Lab 04/13/24  0345 04/14/24  0549   WBC 8.60 6.73   HGB 14.2 14.9   HCT 43.4 47.0    182   GRAN 78.8*  6.8 53.2  3.6   LYMPH 17.7*  1.5 36.6  2.5   MONO 3.0*  0.3 9.4  0.6   EOS 0.0 0.0   BASO 0.01 0.01   BMP:  Recent Labs   Lab 04/13/24  0345 04/14/24  0549   * 137   K 4.3 4.3    103   CO2 16* 26   BUN 14 15   CREATININE 1.2 1.4   * 96   CALCIUM 9.3 9.6   MG  --  2.1   PHOS  --  4.0     Significant Imaging: I have reviewed and interpreted all pertinent imaging results/findings within the past 24 hours.

## 2024-04-15 LAB
ANION GAP SERPL CALC-SCNC: 10 MMOL/L (ref 8–16)
BASOPHILS # BLD AUTO: 0.02 K/UL (ref 0–0.2)
BASOPHILS NFR BLD: 0.3 % (ref 0–1.9)
BUN SERPL-MCNC: 21 MG/DL (ref 8–23)
CALCIUM SERPL-MCNC: 9.2 MG/DL (ref 8.7–10.5)
CHLORIDE SERPL-SCNC: 102 MMOL/L (ref 95–110)
CO2 SERPL-SCNC: 22 MMOL/L (ref 23–29)
CREAT SERPL-MCNC: 1.5 MG/DL (ref 0.5–1.4)
DIFFERENTIAL METHOD BLD: NORMAL
EOSINOPHIL # BLD AUTO: 0 K/UL (ref 0–0.5)
EOSINOPHIL NFR BLD: 0.4 % (ref 0–8)
ERYTHROCYTE [DISTWIDTH] IN BLOOD BY AUTOMATED COUNT: 14 % (ref 11.5–14.5)
EST. GFR  (NO RACE VARIABLE): 36 ML/MIN/1.73 M^2
GLUCOSE SERPL-MCNC: 86 MG/DL (ref 70–110)
HCT VFR BLD AUTO: 41.3 % (ref 37–48.5)
HGB BLD-MCNC: 13.2 G/DL (ref 12–16)
IMM GRANULOCYTES # BLD AUTO: 0.01 K/UL (ref 0–0.04)
IMM GRANULOCYTES NFR BLD AUTO: 0.1 % (ref 0–0.5)
LYMPHOCYTES # BLD AUTO: 2.6 K/UL (ref 1–4.8)
LYMPHOCYTES NFR BLD: 36.4 % (ref 18–48)
MAGNESIUM SERPL-MCNC: 2.2 MG/DL (ref 1.6–2.6)
MCH RBC QN AUTO: 30.1 PG (ref 27–31)
MCHC RBC AUTO-ENTMCNC: 32 G/DL (ref 32–36)
MCV RBC AUTO: 94 FL (ref 82–98)
MONOCYTES # BLD AUTO: 0.6 K/UL (ref 0.3–1)
MONOCYTES NFR BLD: 8.6 % (ref 4–15)
NEUTROPHILS # BLD AUTO: 3.8 K/UL (ref 1.8–7.7)
NEUTROPHILS NFR BLD: 54.2 % (ref 38–73)
NRBC BLD-RTO: 0 /100 WBC
OHS QRS DURATION: 66 MS
OHS QTC CALCULATION: 425 MS
PHOSPHATE SERPL-MCNC: 4.6 MG/DL (ref 2.7–4.5)
PLATELET # BLD AUTO: 162 K/UL (ref 150–450)
PMV BLD AUTO: 10.9 FL (ref 9.2–12.9)
POTASSIUM SERPL-SCNC: 4.4 MMOL/L (ref 3.5–5.1)
RBC # BLD AUTO: 4.39 M/UL (ref 4–5.4)
SODIUM SERPL-SCNC: 134 MMOL/L (ref 136–145)
WBC # BLD AUTO: 7.08 K/UL (ref 3.9–12.7)

## 2024-04-15 PROCEDURE — A4216 STERILE WATER/SALINE, 10 ML: HCPCS | Performed by: INTERNAL MEDICINE

## 2024-04-15 PROCEDURE — 25000003 PHARM REV CODE 250: Performed by: INTERNAL MEDICINE

## 2024-04-15 PROCEDURE — 85025 COMPLETE CBC W/AUTO DIFF WBC: CPT | Performed by: INTERNAL MEDICINE

## 2024-04-15 PROCEDURE — 80048 BASIC METABOLIC PNL TOTAL CA: CPT | Performed by: INTERNAL MEDICINE

## 2024-04-15 PROCEDURE — 83735 ASSAY OF MAGNESIUM: CPT | Performed by: INTERNAL MEDICINE

## 2024-04-15 PROCEDURE — 11000001 HC ACUTE MED/SURG PRIVATE ROOM

## 2024-04-15 PROCEDURE — 94761 N-INVAS EAR/PLS OXIMETRY MLT: CPT

## 2024-04-15 PROCEDURE — 97535 SELF CARE MNGMENT TRAINING: CPT

## 2024-04-15 PROCEDURE — 97530 THERAPEUTIC ACTIVITIES: CPT

## 2024-04-15 PROCEDURE — 94799 UNLISTED PULMONARY SVC/PX: CPT | Mod: XB

## 2024-04-15 PROCEDURE — 27000221 HC OXYGEN, UP TO 24 HOURS

## 2024-04-15 PROCEDURE — 63600175 PHARM REV CODE 636 W HCPCS: Performed by: INTERNAL MEDICINE

## 2024-04-15 PROCEDURE — 99900035 HC TECH TIME PER 15 MIN (STAT)

## 2024-04-15 PROCEDURE — 25000242 PHARM REV CODE 250 ALT 637 W/ HCPCS: Performed by: INTERNAL MEDICINE

## 2024-04-15 PROCEDURE — 84100 ASSAY OF PHOSPHORUS: CPT | Performed by: INTERNAL MEDICINE

## 2024-04-15 PROCEDURE — 97116 GAIT TRAINING THERAPY: CPT

## 2024-04-15 PROCEDURE — 36415 COLL VENOUS BLD VENIPUNCTURE: CPT | Performed by: INTERNAL MEDICINE

## 2024-04-15 PROCEDURE — 94640 AIRWAY INHALATION TREATMENT: CPT

## 2024-04-15 RX ADMIN — ASPIRIN 81 MG: 81 TABLET, COATED ORAL at 09:04

## 2024-04-15 RX ADMIN — MORPHINE SULFATE 2 MG: 2 INJECTION, SOLUTION INTRAMUSCULAR; INTRAVENOUS at 08:04

## 2024-04-15 RX ADMIN — OXYCODONE 5 MG: 5 TABLET ORAL at 08:04

## 2024-04-15 RX ADMIN — ACETAMINOPHEN 1000 MG: 500 TABLET ORAL at 09:04

## 2024-04-15 RX ADMIN — FLUTICASONE FUROATE AND VILANTEROL TRIFENATATE 1 PUFF: 100; 25 POWDER RESPIRATORY (INHALATION) at 09:04

## 2024-04-15 RX ADMIN — CARVEDILOL 12.5 MG: 12.5 TABLET, FILM COATED ORAL at 09:04

## 2024-04-15 RX ADMIN — CARVEDILOL 12.5 MG: 12.5 TABLET, FILM COATED ORAL at 04:04

## 2024-04-15 RX ADMIN — SODIUM CHLORIDE, PRESERVATIVE FREE 10 ML: 5 INJECTION INTRAVENOUS at 04:04

## 2024-04-15 RX ADMIN — OXYCODONE 5 MG: 5 TABLET ORAL at 11:04

## 2024-04-15 RX ADMIN — AMLODIPINE BESYLATE 5 MG: 5 TABLET ORAL at 09:04

## 2024-04-15 RX ADMIN — ENOXAPARIN SODIUM 40 MG: 40 INJECTION SUBCUTANEOUS at 04:04

## 2024-04-15 RX ADMIN — OXYCODONE 5 MG: 5 TABLET ORAL at 03:04

## 2024-04-15 RX ADMIN — POLYETHYLENE GLYCOL 3350 17 G: 17 POWDER, FOR SOLUTION ORAL at 09:04

## 2024-04-15 RX ADMIN — CEFTRIAXONE SODIUM 1 G: 1 INJECTION, POWDER, FOR SOLUTION INTRAMUSCULAR; INTRAVENOUS at 05:04

## 2024-04-15 RX ADMIN — LOSARTAN POTASSIUM 100 MG: 50 TABLET, FILM COATED ORAL at 09:04

## 2024-04-15 RX ADMIN — SODIUM CHLORIDE, PRESERVATIVE FREE 10 ML: 5 INJECTION INTRAVENOUS at 11:04

## 2024-04-15 RX ADMIN — FUROSEMIDE 40 MG: 20 TABLET ORAL at 09:04

## 2024-04-15 RX ADMIN — SODIUM CHLORIDE, PRESERVATIVE FREE 10 ML: 5 INJECTION INTRAVENOUS at 03:04

## 2024-04-15 RX ADMIN — MORPHINE SULFATE 2 MG: 2 INJECTION, SOLUTION INTRAMUSCULAR; INTRAVENOUS at 04:04

## 2024-04-15 RX ADMIN — TIOTROPIUM BROMIDE INHALATION SPRAY 2 PUFF: 3.12 SPRAY, METERED RESPIRATORY (INHALATION) at 09:04

## 2024-04-15 RX ADMIN — ATORVASTATIN CALCIUM 80 MG: 20 TABLET, FILM COATED ORAL at 09:04

## 2024-04-15 RX ADMIN — FAMOTIDINE 20 MG: 20 TABLET ORAL at 09:04

## 2024-04-15 RX ADMIN — ACETAMINOPHEN 1000 MG: 500 TABLET ORAL at 04:04

## 2024-04-15 RX ADMIN — SODIUM CHLORIDE, PRESERVATIVE FREE 10 ML: 5 INJECTION INTRAVENOUS at 05:04

## 2024-04-15 NOTE — PLAN OF CARE
Plan of care and expectation from team discusse dwith patient. Patient is [place] to left ear. Repositioned during shift, however did refuse at times. PRN pain medication given with full relief of pain at beginning of shift. Given same medication this morning after rolling in bed, no relief. CN gave IV pain medication. Full relief obtained. Patient on IV abx. Fall precautions in place. Midline to right arm clean/dry;intact.        Problem: Adult Inpatient Plan of Care  Goal: Plan of Care Review  Outcome: Unable to Meet, Plan Revised  Goal: Patient-Specific Goal (Individualized)  Outcome: Unable to Meet, Plan Revised  Goal: Absence of Hospital-Acquired Illness or Injury  Outcome: Unable to Meet, Plan Revised  Goal: Optimal Comfort and Wellbeing  Outcome: Unable to Meet, Plan Revised  Goal: Readiness for Transition of Care  Outcome: Unable to Meet, Plan Revised     Problem: Respiratory Compromise COPD (Chronic Obstructive Pulmonary Disease)  Goal: Effective Oxygenation and Ventilation  Outcome: Unable to Meet, Plan Revised     Problem: Fall Injury Risk  Goal: Absence of Fall and Fall-Related Injury  Outcome: Unable to Meet, Plan Revised     Problem: Skin Injury Risk Increased  Goal: Skin Health and Integrity  Outcome: Unable to Meet, Plan Revised

## 2024-04-15 NOTE — CARE UPDATE
04/15/24 0912   Patient Assessment/Suction   Level of Consciousness (AVPU) alert   Respiratory Effort Normal;Unlabored   Expansion/Accessory Muscles/Retractions no retractions;no use of accessory muscles   Skin Integrity   $ Wound Care Tech Time 15 min   Area Observed Left;Right;Behind ear;Nares   Skin Appearance without discoloration   PRE-TX-O2   Device (Oxygen Therapy) nasal cannula   $ Is the patient on Low Flow Oxygen? Yes   SpO2 (!) 93 %   Pulse Oximetry Type Intermittent   $ Pulse Oximetry - Multiple Charge Pulse Oximetry - Multiple   Pulse 73   Resp 18   Inhaler   $ Inhaler Charges MDI (Metered Dose Inahler) Treatment  (spiriva)   Daily Review of Necessity (Inhaler) completed   Respiratory Treatment Status (Inhaler) given   Treatment Route (Inhaler) mouthpiece   Patient Position (Inhaler) semi-Garcia's   Post Treatment Assessment (Inhaler) breath sounds unchanged   Signs of Intolerance (Inhaler) none

## 2024-04-15 NOTE — PROGRESS NOTES
Pressure Injury Prevention bundle, Support surface, and Registered dietician consultation ordered for a Brigido scale score of 17.

## 2024-04-15 NOTE — SUBJECTIVE & OBJECTIVE
Interval History: No acute events overnight. Chest pain stable with medications. Discussed plan of care. No other concerns at this time.    Review of Systems   Constitutional:  Negative for chills and fever.   Respiratory:  Negative for cough and shortness of breath.    Cardiovascular:  Positive for chest pain. Negative for palpitations.   Gastrointestinal:  Negative for abdominal pain, nausea and vomiting.     Objective:     Vital Signs (Most Recent):  Temp: 98.1 °F (36.7 °C) (04/15/24 0910)  Pulse: 62 (04/15/24 1212)  Resp: 20 (04/15/24 1212)  BP: (!) 111/54 (04/15/24 1212)  SpO2: (!) 94 % (04/15/24 1212) Vital Signs (24h Range):  Temp:  [97.8 °F (36.6 °C)-98.7 °F (37.1 °C)] 98.1 °F (36.7 °C)  Pulse:  [59-73] 62  Resp:  [16-22] 20  SpO2:  [89 %-96 %] 94 %  BP: (111-176)/(54-84) 111/54     Weight: 88 kg (194 lb 0.1 oz)  Body mass index is 39.18 kg/m².    Intake/Output Summary (Last 24 hours) at 4/15/2024 1612  Last data filed at 4/15/2024 0526  Gross per 24 hour   Intake 230 ml   Output 550 ml   Net -320 ml         Physical Exam  Vitals and nursing note reviewed.   Constitutional:       General: She is not in acute distress.     Appearance: She is well-developed.   HENT:      Head: Normocephalic and atraumatic.   Eyes:      General:         Right eye: No discharge.         Left eye: No discharge.      Conjunctiva/sclera: Conjunctivae normal.   Cardiovascular:      Rate and Rhythm: Normal rate.      Pulses: Normal pulses.   Pulmonary:      Effort: Pulmonary effort is normal. No respiratory distress.   Chest:      Comments: Tender to palpation along L axilla.  Abdominal:      Palpations: Abdomen is soft.      Tenderness: There is abdominal tenderness (LUQ).   Musculoskeletal:      Right lower leg: No edema.      Left lower leg: No edema.      Comments: L-sided hemiplegia. Chronic LUE contracture.   Skin:     General: Skin is warm and dry.   Neurological:      Mental Status: She is alert and oriented to person, place,  and time.      Comments: Hard of hearing. Chronic dysarthria.         Significant Labs:   CBC:  Recent Labs   Lab 04/13/24  0345 04/14/24  0549 04/15/24  0323   WBC 8.60 6.73 7.08   HGB 14.2 14.9 13.2   HCT 43.4 47.0 41.3    182 162   GRAN 78.8*  6.8 53.2  3.6 54.2  3.8   LYMPH 17.7*  1.5 36.6  2.5 36.4  2.6   MONO 3.0*  0.3 9.4  0.6 8.6  0.6   EOS 0.0 0.0 0.0   BASO 0.01 0.01 0.02   BMP:  Recent Labs   Lab 04/13/24  0345 04/14/24  0549 04/15/24  0323   * 137 134*   K 4.3 4.3 4.4    103 102   CO2 16* 26 22*   BUN 14 15 21   CREATININE 1.2 1.4 1.5*   * 96 86   CALCIUM 9.3 9.6 9.2   MG  --  2.1 2.2   PHOS  --  4.0 4.6*     Significant Imaging: I have reviewed and interpreted all pertinent imaging results/findings within the past 24 hours.

## 2024-04-15 NOTE — PT/OT/SLP PROGRESS
Physical Therapy Treatment    Patient Name:  Florence Faria   MRN:  [id number]    Recommendations:     Discharge Recommendations: Moderate Intensity Therapy  Discharge Equipment Recommendations: to be determined by next level of care  Barriers to discharge: Decreased caregiver support    Assessment:     Florence Faria is a 73 y.o. female admitted with a medical diagnosis of Closed fracture of one rib of left side. Pmhx pertinent for CVA with L sided hemiplegia/hypertonicity and dysarthria. She presents with the following impairments/functional limitations: weakness, impaired endurance, impaired self care skills, impaired functional mobility, gait instability, impaired balance, decreased upper extremity function, decreased lower extremity function, decreased safety awareness, pain, abnormal tone, decreased ROM, impaired coordination, impaired fine motor, edema, impaired joint extensibility .    Patient with continued severe pain with mobility, but with encouragement performed stepping transfer with Patricia. Continued pain limiting her level of (I) and safety with functional mobility. DC rec for LEOBARDO.    Rehab Prognosis: Good; patient would benefit from acute skilled PT services to address these deficits and reach maximum level of function.    Recent Surgery: * No surgery found *      Plan:     During this hospitalization, patient to be seen 5 x/week to address the identified rehab impairments via gait training, therapeutic activities, therapeutic exercises, neuromuscular re-education, wheelchair management/training and progress toward the following goals:    Plan of Care Expires:  05/14/24    Subjective     Chief Complaint: Pain with sitting   Patient/Family Comments/goals: Thankful for care; Patient agreeable to PT treatment.  Pain/Comfort:  Pain Rating 1:  (unrated)  Location - Side 1: Left  Location 1:  (trunk, arm, and leg)  Pain Addressed 1: Pre-medicate for activity, Reposition, Distraction, Cessation of  Activity, Nurse notified  Pain Rating Post-Intervention 1:  (continued discomfort with bed mobility and transfers)      Objective:     Communicated with DONNA Dewitt prior to session.  Patient found left sidelying with telemetry, PICC line, PureWick, bed alarm upon PT entry to room.     General Precautions: Standard, fall  Orthopedic Precautions: N/A  Braces: N/A  Respiratory Status: Room air    Patient donned non slip socks and gait belt deferred 2/2 rib fx for OOB mobility.    Functional Mobility:  Bed Mobility:     Rolling Left:  minimum assistance  Rolling Right: minimum assistance  Supine to Sit: minimum assistance  Sit to Supine: moderate assistance  Transfers:     Sit to Stand:  minimum assistance with hemiwalker  Gait: x3 steps laterally with Patricia and hemiwalker.   Impaired step coordination 2/2 hemiplegia with increased tone of LLE with slow slide of LLE during swing phase and decreased stance time of LLE.   No overt LOB noted, but decreased endurance and requiring seated rest break suddenly during steps along EOB.      AM-PAC 6 CLICK MOBILITY  Turning over in bed (including adjusting bedclothes, sheets and blankets)?: 3  Sitting down on and standing up from a chair with arms (e.g., wheelchair, bedside commode, etc.): 3  Moving from lying on back to sitting on the side of the bed?: 3  Moving to and from a bed to a chair (including a wheelchair)?: 3  Need to walk in hospital room?: 1  Climbing 3-5 steps with a railing?: 1  Basic Mobility Total Score: 14       Treatment & Education:  Rolling and bed mobility with focus on karen hygiene after bowel incontinence and increasing tolerance for trunk movement 2/2 rib pain  Assistance for short stepping bout along EOB    Patient left right sidelying with all lines intact, call button in reach, bed alarm on, and DONNA Dewitt notified..    GOALS:   Multidisciplinary Problems       Physical Therapy Goals          Problem: Physical Therapy    Goal Priority Disciplines Outcome  Goal Variances Interventions   Physical Therapy Goal     PT, PT/OT Ongoing, Progressing     Description: Goals to be met by: 2024    Patient will increase functional independence with mobility by performin. Sit<>stand with SBA with Darnell walker.  2. Pivot transfer Bed<>WC with SBA.  3. Transfer supine<>sit with SBA without use of HB features                       Time Tracking:     PT Received On: 04/15/24  PT Start Time: 1456     PT Stop Time: 1528  PT Total Time (min): 32 min     Overlap with OT for portions of session due to complex nature of patient, tolerance for therapeutic modalities, and safety with mobility to decrease fall risk for patient and caregiver injury requiring two skilled therapists to provide interventions.    Billable Minutes: Gait Training 8 and Therapeutic Activity 20    Treatment Type: Treatment  PT/PTA: PT     Number of PTA visits since last PT visit: 0     04/15/2024

## 2024-04-15 NOTE — ASSESSMENT & PLAN NOTE
Debility, fall, L hemiplegia  - 8th rib fracture likely secondary to recent fall; difficult to identify on CXR but noted on CT Abd/Pelvis.  - Symptomatic management; continue incentive spirometry, acetaminophen 1000mg PO TID, oxycodone 5mg/10mg PO q4hr PRN, methocarbamol 500mg PO TID PRN.  - PT/OT recommending moderate intensity. Pursuing SNF placement.

## 2024-04-15 NOTE — ASSESSMENT & PLAN NOTE
- UA suggestive of UTI. Urine culture with GNRs to date.  - Continue ceftriaxone 1g IV q24hr; continue pending final culture results but anticipate completion of course soon.

## 2024-04-15 NOTE — PROGRESS NOTES
[facility] ([address] Medicine  Progress Note    Patient Name: Florence Faria  MRN: [id number]  Patient Class: IP- Inpatient   Admission Date: 4/13/2024  Length of Stay: 1 days  Attending Physician: GOPAL Alvares MD  Primary Care Provider: [facility]        Subjective:     Principal Problem:Closed fracture of one rib of left side        HPI:  Ms. Faria is a 73/F with PMH HTN, HLD, CAD, h/o MI, h/o stroke x2 with residual left-sided hemiplegia who presented to [facility] 04/13 with a 2 week history of progressive left-sided chest wall and abdomen pain.  Two weeks prior to presentation she fell onto her left side while getting out of the bathtub; she went to [facility] and was told she had rib contusion and discharged home. Has had continued difficulty getting around her house and has been less mobile as well.  For the four days prior to presentation she had poor PO intake due to her decreased mobility.  She notes intermittent chills and dry cough for the two days prior to presentation with some abdominal discomfort, nausea and vomiting as well as no BM for the four days prior to presentation. With persistent discomfort she contacted EMS and received nebulization en route. In ED, workup was notable for UA with 27 WBCs, many bacteria, nitrite positive, no leukocytosis, CXR with potential L basilar atelectasis, and CT Abd/Pelvis with nonspecific gallbladder distention with mild displaced posterior L 8th rib fracture. She received ceftriaxone, azithromycin, morphine IV and hospital medicine was contacted for observation.    Overview/Hospital Course:  Admitted with L 8th rib fracture and UTI. Started ceftriaxone and PT/OT consulted as well as symptomatic control. Therapy recommended moderate intensity and placement sought.    Interval History: No acute events overnight. Chest pain stable with medications. Discussed plan of care. No other concerns at this  time.    Review of Systems   Constitutional:  Negative for chills and fever.   Respiratory:  Negative for cough and shortness of breath.    Cardiovascular:  Positive for chest pain. Negative for palpitations.   Gastrointestinal:  Negative for abdominal pain, nausea and vomiting.     Objective:     Vital Signs (Most Recent):  Temp: 98.1 °F (36.7 °C) (04/15/24 0910)  Pulse: 62 (04/15/24 1212)  Resp: 20 (04/15/24 1212)  BP: (!) 111/54 (04/15/24 1212)  SpO2: (!) 94 % (04/15/24 1212) Vital Signs (24h Range):  Temp:  [97.8 °F (36.6 °C)-98.7 °F (37.1 °C)] 98.1 °F (36.7 °C)  Pulse:  [59-73] 62  Resp:  [16-22] 20  SpO2:  [89 %-96 %] 94 %  BP: (111-176)/(54-84) 111/54     Weight: 88 kg (194 lb 0.1 oz)  Body mass index is 39.18 kg/m².    Intake/Output Summary (Last 24 hours) at 4/15/2024 1612  Last data filed at 4/15/2024 0526  Gross per 24 hour   Intake 230 ml   Output 550 ml   Net -320 ml         Physical Exam  Vitals and nursing note reviewed.   Constitutional:       General: She is not in acute distress.     Appearance: She is well-developed.   HENT:      Head: Normocephalic and atraumatic.   Eyes:      General:         Right eye: No discharge.         Left eye: No discharge.      Conjunctiva/sclera: Conjunctivae normal.   Cardiovascular:      Rate and Rhythm: Normal rate.      Pulses: Normal pulses.   Pulmonary:      Effort: Pulmonary effort is normal. No respiratory distress.   Chest:      Comments: Tender to palpation along L axilla.  Abdominal:      Palpations: Abdomen is soft.      Tenderness: There is abdominal tenderness (LUQ).   Musculoskeletal:      Right lower leg: No edema.      Left lower leg: No edema.      Comments: L-sided hemiplegia. Chronic LUE contracture.   Skin:     General: Skin is warm and dry.   Neurological:      Mental Status: She is alert and oriented to person, place, and time.      Comments: Hard of hearing. Chronic dysarthria.         Significant Labs:   CBC:  Recent Labs   Lab 04/13/24  8394  04/14/24  0549 04/15/24  0323   WBC 8.60 6.73 7.08   HGB 14.2 14.9 13.2   HCT 43.4 47.0 41.3    182 162   GRAN 78.8*  6.8 53.2  3.6 54.2  3.8   LYMPH 17.7*  1.5 36.6  2.5 36.4  2.6   MONO 3.0*  0.3 9.4  0.6 8.6  0.6   EOS 0.0 0.0 0.0   BASO 0.01 0.01 0.02   BMP:  Recent Labs   Lab 04/13/24  0345 04/14/24  0549 04/15/24  0323   * 137 134*   K 4.3 4.3 4.4    103 102   CO2 16* 26 22*   BUN 14 15 21   CREATININE 1.2 1.4 1.5*   * 96 86   CALCIUM 9.3 9.6 9.2   MG  --  2.1 2.2   PHOS  --  4.0 4.6*     Significant Imaging: I have reviewed and interpreted all pertinent imaging results/findings within the past 24 hours.      Assessment/Plan:      * Closed fracture of one rib of left side  Debility, fall, L hemiplegia  - 8th rib fracture likely secondary to recent fall; difficult to identify on CXR but noted on CT Abd/Pelvis.  - Symptomatic management; continue incentive spirometry, acetaminophen 1000mg PO TID, oxycodone 5mg/10mg PO q4hr PRN, methocarbamol 500mg PO TID PRN.  - PT/OT recommending moderate intensity. Pursuing SNF placement.    GERD (gastroesophageal reflux disease)  - Continue famotidine 20mg PO daily.    Constipation  - Start polyethylene glycol 17g PO BID, senna-docusate 8.6-50mg PO BID.  - Monitor for BM and adjust as required.    Acute cystitis without hematuria  - UA suggestive of UTI. Urine culture with GNRs to date.  - Continue ceftriaxone 1g IV q24hr; continue pending final culture results but anticipate completion of course soon.    Chronic obstructive pulmonary disease  - On [facility] outpatient.  - No strong evidence of acute exacerbation. Lower suspicion for concurrent respiratory infection.  - Continue with fluticasone-vilanterol 100-25mcg inhaled daily, tiotropium 5mcg inhaled daily, albuterol-ipratropium 2.5-0.5mg inhaled q4hr PRN.    Coronary artery disease  HTN, HLD, h/o stroke  - Continue aspirin 81mg PO daily, atorvastatin 80mg PO daily, carvedilol 12.5mg PO  BID, furosemide 40mg PO daily, losartan 100mg PO daily.      VTE Risk Mitigation (From admission, onward)           Ordered     enoxaparin injection 40 mg  Daily         04/13/24 0655     IP VTE HIGH RISK PATIENT  Once         04/13/24 0655                    Discharge Planning   RAVINDRA:      Code Status: Full Code   Is the patient medically ready for discharge?:     Reason for patient still in hospital (select all that apply): Pending disposition  Discharge Plan A: Skilled Nursing Facility                  VICENTE Alvares MD  Department of Hospital Medicine   [facility] ([address])

## 2024-04-15 NOTE — PLAN OF CARE
NESS called in awaiting 142.    PASSR/142 uploaded to [facility].    CM spoke with daughter Emily and updated on accepting facilities. Daughter prefers facilities on [facility] where she lives so she would be able to visit her. Informed daughter that [facility] and [facility] of acceptance. Daughter will visit facilities and make decision.    I certify I provided patient choice and a list to the patient/family of CMS rated Home Health, SNF, IRF, LTACH, retirement Nursing Homes Patient/Family signed Patient's Choice Disclosure Form choosing the following    [facility]

## 2024-04-15 NOTE — PT/OT/SLP PROGRESS
Occupational Therapy   Treatment    Name: Florence Faria  MRN: [id number]  Admitting Diagnosis:  Closed fracture of one rib of left side       Recommendations:     Discharge Recommendations: Moderate Intensity Therapy  Discharge Equipment Recommendations:  to be determined by next level of care  Barriers to discharge:  Decreased caregiver support (Current functional level)    Assessment:     Florence Faria is a 73 y.o. female with a medical diagnosis of Closed fracture of one rib of left side.  She presents with the following performance deficits affecting function: weakness, impaired endurance, impaired sensation, impaired self care skills, impaired functional mobility, gait instability, impaired balance, pain, impaired cardiopulmonary response to activity, decreased safety awareness, decreased lower extremity function, decreased upper extremity function, impaired skin, edema, impaired fine motor, impaired coordination, decreased ROM, abnormal tone, impaired joint extensibility, impaired muscle length.     Rehab Prognosis:   Fair/Good ; patient would benefit from acute skilled OT services to address these deficits and reach maximum level of function.       Plan:     Patient to be seen 5 x/week to address the above listed problems via self-care/home management, therapeutic activities, therapeutic exercises  Plan of Care Expires: 05/13/24  Plan of Care Reviewed with: patient    Subjective     Chief Complaint: Pain  Patient/Family Comments/goals: Pt agreeable to participating in therapy session and thanking OT at end of session for working with her.  Pain/Comfort:  Pain Rating 1:  (Pt not rating pain.  Pt has some arm, leg, and rib pain.)    Objective:     Communicated with: nurse prior to session.  Patient found HOB elevated with bed alarm, PICC line, telemetry, PureWick upon OT entry to room.    General Precautions: Standard, fall    Orthopedic Precautions:N/A  Braces: N/A  Respiratory Status: 2L via NC      Occupational Performance:     Bed Mobility:    Rolling:  Min A  Supine to sit: Min A  Sit to supine: Mod A  Repositioning in bed: use of bed rails, takes extended time using compensation techniques    Functional Mobility/Transfers:  Sit to stand: Min A with hemiwalker  Functional Mobility: Min A with hemiwalker for taking side steps at side of bed, goes up on left toes in preparation for taking step and step with left foot is short and done on ball/toes of foot.  Slow mobility but no LOB.  Extended time to complete.     Activities of Daily Living:  Toileting: Incontinence, pt soiled with BM requiring Total A for hygiene in bed, pt assisting by performing rolling at Min A using bed rails  UB Dressing: Min A  Extended time to complete tasks      AMPA 6 Click ADL: 14    Treatment & Education:  Role of OT, POC, ADL and ADL mobility training, safety, discharge recs     Patient left  right sidelying with HOB elevated and pillow between knees  with all lines intact, call button in reach, and nurse notified    GOALS:   Multidisciplinary Problems       Occupational Therapy Goals          Problem: Occupational Therapy    Goal Priority Disciplines Outcome Interventions   Occupational Therapy Goal     OT, PT/OT Ongoing, Progressing    Description: OT goals to be met by 4/27/24  1. Bed mobility needed for participation in EOB/OOB ADL with SBA  2. UB dressing, ivon technique, with Setup/SPV  3. LB dressing, adaptive technique with Min A  4. Functional transfers with LRAD and Min A                       Time Tracking:     OT Date of Treatment: 04/15/24  OT Start Time: 1457  OT Stop Time: 1557  OT Total Time (min): 60 min    Billable Minutes:Self Care/Home Management 60    OT/JONATHON: OT      Overlap with PT for portions of session due to complex nature of pt and need for increased safety.  Two skilled therapists needed during functional mobility to decrease patient fall risk and decrease risk of caregiver injury.        4/15/2024

## 2024-04-15 NOTE — HOSPITAL COURSE
Admitted with L 8th rib fracture and UTI. Started ceftriaxone and PT/OT consulted as well as symptomatic control. Patient completed full course of treatment for Klebsiella pneumoniae UTI with ceftriaxone during hospital stay. She did have episode of COPD exacerbation treated with short course of prednisone, azithromycin and NEBS, with quick response and full course of treatment to be completed at SNF. Therapy recommended moderate intensity and placement arranged for SNF.    Closed fracture of one rib of left side  Debility, fall, L hemiplegia  - 8th rib fracture likely secondary to recent fall; difficult to identify on CXR but noted on CT Abd/Pelvis.  - Symptomatic management; continued incentive spirometry, acetaminophen 1000mg PO TID, oxycodone 5mg/10mg PO q4hr PRN, methocarbamol 500mg PO TID PRN.  - PT/OT recommending moderate intensity. Pursued SNF placement.    Acute cystitis without hematuria  - UA suggestive of UTI. Urine culture with Klebsiella pneumoniae sensitive to everything except for intermediate to nitrofurantoin  - Completed full course of treatment with ceftriaxone during hospitalization  - Resolved     Chronic obstructive pulmonary disease with acute exacerbation  - On [facility] outpatient.  - Continued with fluticasone-vilanterol 100-25mcg inhaled daily, tiotropium 5mcg inhaled daily  - Extensive coughing and wheezing noted on exam on 4/16, treated with prednisone 40 mg daily and azithromycin, and changed albuterol-ipratropium 2.5-0.5mg inhaled q4hr while awake from PRN  - Wheezing almost fully resolved, and patient was able to sleep without coughing waking her up   - Patient to complete short course of prednisone 40 mg daily x 4 days and full 5 day course of azithromycin inclusive of days completed in the hospital    Coronary artery disease  HTN, HLD, h/o stroke  - Continued aspirin 81mg PO daily, atorvastatin 80mg PO daily, carvedilol 12.5mg PO BID, furosemide 40mg PO daily, losartan 100mg PO  daily.    GERD (gastroesophageal reflux disease)  - Continued famotidine 20mg PO daily.     Constipation  - Started polyethylene glycol 17g PO BID, senna-docusate 8.6-50mg PO BID.  - Multiple bowel movements, resolved

## 2024-04-15 NOTE — PLAN OF CARE
[facility] insurance auth pending.   04/15/24 9138   Post-Acute Status   Post-Acute Authorization Placement   Post-Acute Placement Status Pending payor review/awaiting authorization (if required)   Discharge Plan   Discharge Plan A Skilled Nursing Facility

## 2024-04-16 PROBLEM — J44.1 CHRONIC OBSTRUCTIVE PULMONARY DISEASE WITH ACUTE EXACERBATION: Status: ACTIVE | Noted: 2019-02-12

## 2024-04-16 LAB — BACTERIA UR CULT: ABNORMAL

## 2024-04-16 PROCEDURE — 94640 AIRWAY INHALATION TREATMENT: CPT

## 2024-04-16 PROCEDURE — 97530 THERAPEUTIC ACTIVITIES: CPT

## 2024-04-16 PROCEDURE — 94761 N-INVAS EAR/PLS OXIMETRY MLT: CPT

## 2024-04-16 PROCEDURE — 63700000 PHARM REV CODE 250 ALT 637 W/O HCPCS: Performed by: HOSPITALIST

## 2024-04-16 PROCEDURE — 25000242 PHARM REV CODE 250 ALT 637 W/ HCPCS: Performed by: HOSPITALIST

## 2024-04-16 PROCEDURE — 63600175 PHARM REV CODE 636 W HCPCS: Performed by: HOSPITALIST

## 2024-04-16 PROCEDURE — 11000001 HC ACUTE MED/SURG PRIVATE ROOM

## 2024-04-16 PROCEDURE — 30200315 PPD INTRADERMAL TEST REV CODE 302: Performed by: HOSPITALIST

## 2024-04-16 PROCEDURE — 97530 THERAPEUTIC ACTIVITIES: CPT | Mod: CQ

## 2024-04-16 PROCEDURE — 27000221 HC OXYGEN, UP TO 24 HOURS

## 2024-04-16 PROCEDURE — 63600175 PHARM REV CODE 636 W HCPCS: Performed by: INTERNAL MEDICINE

## 2024-04-16 PROCEDURE — 86580 TB INTRADERMAL TEST: CPT | Performed by: HOSPITALIST

## 2024-04-16 PROCEDURE — A4216 STERILE WATER/SALINE, 10 ML: HCPCS | Performed by: INTERNAL MEDICINE

## 2024-04-16 PROCEDURE — 99900035 HC TECH TIME PER 15 MIN (STAT)

## 2024-04-16 PROCEDURE — 25000003 PHARM REV CODE 250: Performed by: INTERNAL MEDICINE

## 2024-04-16 RX ORDER — AZITHROMYCIN 250 MG/1
500 TABLET, FILM COATED ORAL ONCE
Status: COMPLETED | OUTPATIENT
Start: 2024-04-16 | End: 2024-04-16

## 2024-04-16 RX ORDER — PREDNISONE 20 MG/1
40 TABLET ORAL DAILY
Status: DISCONTINUED | OUTPATIENT
Start: 2024-04-16 | End: 2024-04-17 | Stop reason: HOSPADM

## 2024-04-16 RX ORDER — IPRATROPIUM BROMIDE AND ALBUTEROL SULFATE 2.5; .5 MG/3ML; MG/3ML
3 SOLUTION RESPIRATORY (INHALATION)
Status: DISCONTINUED | OUTPATIENT
Start: 2024-04-16 | End: 2024-04-17 | Stop reason: HOSPADM

## 2024-04-16 RX ORDER — AZITHROMYCIN 250 MG/1
250 TABLET, FILM COATED ORAL DAILY
Status: DISCONTINUED | OUTPATIENT
Start: 2024-04-17 | End: 2024-04-17 | Stop reason: HOSPADM

## 2024-04-16 RX ADMIN — CARVEDILOL 12.5 MG: 12.5 TABLET, FILM COATED ORAL at 09:04

## 2024-04-16 RX ADMIN — SODIUM CHLORIDE, PRESERVATIVE FREE 10 ML: 5 INJECTION INTRAVENOUS at 05:04

## 2024-04-16 RX ADMIN — FUROSEMIDE 40 MG: 20 TABLET ORAL at 09:04

## 2024-04-16 RX ADMIN — METHOCARBAMOL 500 MG: 500 TABLET ORAL at 12:04

## 2024-04-16 RX ADMIN — BISACODYL 10 MG: 5 TABLET, COATED ORAL at 04:04

## 2024-04-16 RX ADMIN — LOSARTAN POTASSIUM 100 MG: 50 TABLET, FILM COATED ORAL at 09:04

## 2024-04-16 RX ADMIN — ACETAMINOPHEN 1000 MG: 500 TABLET ORAL at 09:04

## 2024-04-16 RX ADMIN — SODIUM CHLORIDE, PRESERVATIVE FREE 10 ML: 5 INJECTION INTRAVENOUS at 12:04

## 2024-04-16 RX ADMIN — PREDNISONE 40 MG: 20 TABLET ORAL at 09:04

## 2024-04-16 RX ADMIN — ACETAMINOPHEN 1000 MG: 500 TABLET ORAL at 08:04

## 2024-04-16 RX ADMIN — ONDANSETRON 4 MG: 2 INJECTION INTRAMUSCULAR; INTRAVENOUS at 09:04

## 2024-04-16 RX ADMIN — FAMOTIDINE 20 MG: 20 TABLET ORAL at 09:04

## 2024-04-16 RX ADMIN — SENNOSIDES AND DOCUSATE SODIUM 1 TABLET: 8.6; 5 TABLET ORAL at 09:04

## 2024-04-16 RX ADMIN — MORPHINE SULFATE 2 MG: 2 INJECTION, SOLUTION INTRAMUSCULAR; INTRAVENOUS at 03:04

## 2024-04-16 RX ADMIN — POLYETHYLENE GLYCOL 3350 17 G: 17 POWDER, FOR SOLUTION ORAL at 09:04

## 2024-04-16 RX ADMIN — FLUTICASONE FUROATE AND VILANTEROL TRIFENATATE 1 PUFF: 100; 25 POWDER RESPIRATORY (INHALATION) at 10:04

## 2024-04-16 RX ADMIN — TUBERCULIN PURIFIED PROTEIN DERIVATIVE 5 UNITS: 5 INJECTION, SOLUTION INTRADERMAL at 12:04

## 2024-04-16 RX ADMIN — CARVEDILOL 12.5 MG: 12.5 TABLET, FILM COATED ORAL at 04:04

## 2024-04-16 RX ADMIN — OXYCODONE HYDROCHLORIDE 10 MG: 10 TABLET ORAL at 05:04

## 2024-04-16 RX ADMIN — TIOTROPIUM BROMIDE INHALATION SPRAY 2 PUFF: 3.12 SPRAY, METERED RESPIRATORY (INHALATION) at 10:04

## 2024-04-16 RX ADMIN — OXYCODONE 5 MG: 5 TABLET ORAL at 03:04

## 2024-04-16 RX ADMIN — ATORVASTATIN CALCIUM 80 MG: 20 TABLET, FILM COATED ORAL at 09:04

## 2024-04-16 RX ADMIN — IPRATROPIUM BROMIDE AND ALBUTEROL SULFATE 3 ML: 2.5; .5 SOLUTION RESPIRATORY (INHALATION) at 07:04

## 2024-04-16 RX ADMIN — AZITHROMYCIN DIHYDRATE 500 MG: 250 TABLET ORAL at 09:04

## 2024-04-16 RX ADMIN — IPRATROPIUM BROMIDE AND ALBUTEROL SULFATE 3 ML: 2.5; .5 SOLUTION RESPIRATORY (INHALATION) at 03:04

## 2024-04-16 RX ADMIN — ASPIRIN 81 MG: 81 TABLET, COATED ORAL at 09:04

## 2024-04-16 RX ADMIN — AMLODIPINE BESYLATE 5 MG: 5 TABLET ORAL at 09:04

## 2024-04-16 RX ADMIN — ENOXAPARIN SODIUM 40 MG: 40 INJECTION SUBCUTANEOUS at 04:04

## 2024-04-16 RX ADMIN — IPRATROPIUM BROMIDE AND ALBUTEROL SULFATE 3 ML: 2.5; .5 SOLUTION RESPIRATORY (INHALATION) at 10:04

## 2024-04-16 RX ADMIN — CEFTRIAXONE SODIUM 1 G: 1 INJECTION, POWDER, FOR SOLUTION INTRAMUSCULAR; INTRAVENOUS at 05:04

## 2024-04-16 NOTE — PT/OT/SLP PROGRESS
"Physical Therapy Treatment    Patient Name:  Florence Faria   MRN:  [id number]    Recommendations:     Discharge Recommendations: Moderate Intensity Therapy  Discharge Equipment Recommendations: to be determined by next level of care  Barriers to discharge: Decreased caregiver support    Assessment:     Florence Faria is a 73 y.o. female admitted with a medical diagnosis of Closed fracture of one rib of left side.  She presents with the following impairments/functional limitations: weakness, edema, abnormal tone, pain, gait instability, impaired endurance, impaired sensation, impaired self care skills, impaired functional mobility, impaired balance, impaired cardiopulmonary response to activity, impaired skin, impaired fine motor, impaired coordination, impaired joint extensibility, impaired muscle length, decreased lower extremity function, decreased ROM, decreased safety awareness, decreased upper extremity function.    Supine to sit with ModA x 2, management of BLE and trunk required  Static sitting at EOB with CGA for 12', pt wanting to lie back down near the end of treatment   Sit to stand with Rosangela with hemiwalker, required cueing for hand placement on bed rail and hemiwalker  Static stand for 30" with hemiwalker and Rosangela  Sit to supine  with MaxA x 2 for safety, pt wanted to lie down towards foot of bed to adjust self but advised to lie down towards HOB  Scooting towards HOB with CGA x 2, required use of R hand rail and overhead hand rail in Trendelenburg position  Pt wanting to participate with therapy but felt lightheaded when sitting at EOB, SpO2 on entry 91%, sitting at EOB 98%, after treatment 95%  Rec Moderate Intensity Therapy        Rehab Prognosis: Good; patient would benefit from acute skilled PT services to address these deficits and reach maximum level of function.    Recent Surgery: * No surgery found *      Plan:     During this hospitalization, patient to be seen 5 x/week to address the " "identified rehab impairments via gait training, therapeutic activities, therapeutic exercises, neuromuscular re-education, wheelchair management/training and progress toward the following goals:    Plan of Care Expires:  05/14/24    Subjective     Chief Complaint: I'm feeling lightheaded  Patient/Family Comments/goals: I'm going to hug you like I love you so I don't fall   Pain/Comfort:  Pain Rating 1: 0/10  Location - Side 1: Left  Location 1: rib(s)  Pain Addressed 1: Pre-medicate for activity, Reposition, Distraction, Cessation of Activity  Pain Rating Post-Intervention 1:  (continued pain)      Objective:     Communicated with nurse Tucker prior to session.  Patient found HOB elevated with bed alarm, PICC line, PureWick, telemetry upon PT entry to room.     General Precautions: Standard, fall  Orthopedic Precautions: N/A  Braces: N/A  Respiratory Status: Nasal cannula, flow .5 L/min, left at 1 L/min     Functional Mobility:  Bed Mobility:     Scooting: contact guard assistance and of 2 persons  Supine to Sit: moderate assistance and of 2 persons  Sit to Supine: maximal assistance and of 2 persons  Transfers:     Sit to Stand:  minimum assistance with hemiwalker  Balance: Static sitting at EOB with CGA for 12', pt wanting to lie back down near the end of treatment,Static stand for 30" with hemiwalker and Rosangela      AM-PAC 6 CLICK MOBILITY  Turning over in bed (including adjusting bedclothes, sheets and blankets)?: 3  Sitting down on and standing up from a chair with arms (e.g., wheelchair, bedside commode, etc.): 3  Moving from lying on back to sitting on the side of the bed?: 3  Moving to and from a bed to a chair (including a wheelchair)?: 3  Need to walk in hospital room?: 1  Climbing 3-5 steps with a railing?: 1  Basic Mobility Total Score: 14       Treatment & Education:  Seated Ther-ex: R LAQ, heel raises x10  Educated pt on importance of OOB and role of PT  Transfers as noted    Patient left HOB elevated " with all lines intact, call button in reach, and bed alarm on..    GOALS:   Multidisciplinary Problems       Physical Therapy Goals          Problem: Physical Therapy    Goal Priority Disciplines Outcome Goal Variances Interventions   Physical Therapy Goal     PT, PT/OT Ongoing, Progressing     Description: Goals to be met by: 2024    Patient will increase functional independence with mobility by performin. Sit<>stand with SBA with Darnell walker.  2. Pivot transfer Bed<>WC with SBA.  3. Transfer supine<>sit with SBA without use of HB features                       Time Tracking:     PT Received On: 24  PT Start Time: 1137     PT Stop Time: 1213  PT Total Time (min): 36 min     Billable Minutes: Therapeutic Activity 36    Treatment Type: Treatment  PT/PTA: PTA     Number of PTA visits since last PT visit: 2024

## 2024-04-16 NOTE — PROGRESS NOTES
[facility] - Med Surg ([address])  Wound Care    Patient Name:  Florence Faria   MRN:  [id number]  Date: 4/16/2024  Diagnosis: Closed fracture of one rib of left side    History:     Past Medical History:   Diagnosis Date    Heart attack     Hyperlipidemia     Hypertension     Stroke        Social History     Socioeconomic History    Marital status:    Tobacco Use    Smoking status: Former     Current packs/day: 0.00     Types: Cigarettes     Quit date: 1/1/2014     Years since quitting: 10.2    Smokeless tobacco: Never   Substance and Sexual Activity    Alcohol use: No     Social Determinants of Health     Financial Resource Strain: High Risk (4/14/2024)    Overall Financial Resource Strain (CARDIA)     Difficulty of Paying Living Expenses: Hard   Food Insecurity: No Food Insecurity (4/14/2024)    Hunger Vital Sign     Worried About Running Out of Food in the Last Year: Never true     Ran Out of Food in the Last Year: Never true   Transportation Needs: No Transportation Needs (4/14/2024)    PRAPARE - Transportation     Lack of Transportation (Medical): No     Lack of Transportation (Non-Medical): No   Physical Activity: Inactive (4/14/2024)    Exercise Vital Sign     Days of Exercise per Week: 0 days     Minutes of Exercise per Session: 0 min   Stress: No Stress Concern Present (4/14/2024)    [employer] [place] [employer] - Occupational Stress Questionnaire     Feeling of Stress : Not at all   Social Connections: Moderately Isolated (4/14/2024)    Social Connection and Isolation Panel [NHANES]     Frequency of Communication with Friends and Family: More than three times a week     Frequency of Social Gatherings with Friends and Family: More than three times a week     Attends Tenriism Services: More than 4 times per year     Active Member of Clubs or Organizations: No     Attends Club or Organization Meetings: Never     Marital Status: Never    Housing Stability: High Risk (4/14/2024)  "   Housing Stability Vital Sign     Unable to Pay for Housing in the Last Year: Yes     Homeless in the Last Year: No       Precautions:     Allergies as of 04/13/2024    (No Known Allergies)       [facility] Assessment Details/Treatment   73/F with PMH HTN, HLD, CAD, h/o MI, h/o stroke x2 with residual left-sided hemiplegia who presented to [facility] 04/13 with a 2 week history of progressive left-sided chest wall and abdomen pain. Two weeks prior to presentation she fell onto her left side while getting out of the bathtub; she went to [facility] and was told she had rib contusion and discharged home. ED workup was notable for UA with 27 WBCs, many bacteria, nitrite positive, no leukocytosis, CXR with potential L basilar atelectasis, and CT Abd/Pelvis with nonspecific gallbladder distention with mild displaced posterior L 8th rib fracture.    Wound care consulted to see for left axilla MAD  Intertrigo of axilla and arm noted. Patient is unable to move her arm as she is contracted. Thick build up of cheesy brown exudate cleaned from axilla and extremity . Malodor noted. Cleansed tissue , patted dry and applied Interdry AG wicking textile to affected area. Noted some serosanguinous exudate upon patting skin dry. Pt stated" they pulled up my arm and tore the skin"  Will place order for area to be cleansed daily and Interdry fabric applied.      04/16/24 1057        Wound 04/13/24 1545 Intertrigo Left lateral Axilla #1   Date First Assessed/Time First Assessed: 04/13/24 1545   Present on Original Admission: Yes  Primary Wound Type: Intertrigo  Side: Left  Orientation: lateral  Location: Axilla  Wound Number: #1  Is this injury device related?: No   Wound Image    Drainage Amount Moderate   Drainage Characteristics/Odor Malodorous;Creamy;Tan;Brown  (Cheesy odor)   Appearance Moist;Other (see comments)  (dark discoloration from chronic intertrigo moisture)   Tissue loss description Partial thickness   Periwound Area Denuded;Other " (see comments)  (dark discoloration form chronic intertrigo)   Care Cleansed with:;Soap and water;Applied:;Other (see comments)  ([facility] silver wicking textile)           04/16/2024

## 2024-04-16 NOTE — PT/OT/SLP PROGRESS
Occupational Therapy   Treatment    Name: Florence Faria  MRN: [id number]  Admitting Diagnosis:  Closed fracture of one rib of left side       Recommendations:     Discharge Recommendations: Moderate Intensity Therapy  Discharge Equipment Recommendations:  to be determined by next level of care  Barriers to discharge:  Decreased caregiver support (Current functional level)    Assessment:     Florence Faria is a 73 y.o. female with a medical diagnosis of Closed fracture of one rib of left side.  She presents with the following performance deficits affecting function:  weakness, impaired endurance, impaired self care skills, impaired functional mobility, gait instability, impaired balance, abnormal tone, pain, decreased safety awareness, decreased lower extremity function, decreased upper extremity function, decreased ROM, impaired cardiopulmonary response to activity, impaired coordination, impaired fine motor, impaired joint extensibility.     Rehab Prognosis:  Fair; patient would benefit from acute skilled OT services to address these deficits and reach maximum level of function.       Plan:     Patient to be seen 5 x/week to address the above listed problems via self-care/home management, therapeutic activities, therapeutic exercises  Plan of Care Expires: 05/13/24  Plan of Care Reviewed with: patient    Subjective     Chief Complaint: Pain   Patient/Family Comments/goals: Pt declining to get out of bed during tx session due to already getting out of bed earlier today and because of pain.  Pain/Comfort:  Pain Rating 1:  (Pain when moving but pt didn't rate pain.)  Location 1: rib(s)  Pain Addressed 1: Pre-medicate for activity, Reposition, Distraction, Cessation of Activity, Nurse notified (Pt took pain medication during OT tx session but pain medication not taking effect during OT tx session.)    Objective:     Communicated with: nurse prior to session.  Patient found HOB elevated with PICC line and female  external catheter upon OT entry to room.  Urine on pt's bed pads.    General Precautions: Standard, fall    Orthopedic Precautions:N/A  Braces: N/A  Respiratory Status: Nasal cannula, flow 2 L/min     Occupational Performance:     Bed Mobility:    Rolling: Min A for toilet hygiene in bed.   Clean linens put on bed and changed female external catheter as well.       Functional Mobility/Transfers:  Pt declined getting OOB    Activities of Daily Living:  BSC set up next to pt's bed for transfer training and self care training but pt adamant about not getting OOB, pt c/o pain and nurse administered pain medication but pt still declined getting OOB.  Education on purpose of pain management with pain medication as well as benefits of getting OOB and possible negative outcomes of not increasing time OOB and moving.       [facility] 6 Click ADL: 14    Treatment & Education:    Role of OT, POC, purpose of pain medication/management in therapy, bed mobility, toilet hygiene and skin integrity, progression to use BSC     Patient left  right sidelying and HOB elevated with pillow behind back and pillow between legs  with all lines intact and call button in reach    GOALS:   Multidisciplinary Problems       Occupational Therapy Goals          Problem: Occupational Therapy    Goal Priority Disciplines Outcome Interventions   Occupational Therapy Goal     OT, PT/OT Ongoing, Progressing    Description: OT goals to be met by 4/27/24  1. Bed mobility needed for participation in EOB/OOB ADL with SBA  2. UB dressing, ivon technique, with Setup/SPV  3. LB dressing, adaptive technique with Min A  4. Functional transfers with LRAD and Min A                       Time Tracking:     OT Date of Treatment: 04/16/24  OT Start Time: 1544  OT Stop Time: 1621  OT Total Time (min): 37 min    Billable Minutes:Therapeutic Activity 37    OT/JONATHON: OT          4/16/2024

## 2024-04-16 NOTE — PLAN OF CARE
Recommendations  1) Commercial beverages BID   2) Continue cardiac diet    3) Honor pt's food preference to encourage intake of meals    4) RD to monitor and follow up    Goals:   Pt will be able to tolerate 50-75% EEN/EPN by RD follow up  Nutrition Goal Status: new  Communication of RD Recs:  (POC)

## 2024-04-16 NOTE — PROGRESS NOTES
[facility] [place], [place] Surg ([address] Medicine  Progress Note    Patient Name: Florence Faria  MRN: [id number]  Patient Class: IP- Inpatient   Admission Date: 4/13/2024  Length of Stay: 2 days  Attending Physician: Saskia Aquino MD  Primary Care Provider: [facility]        Subjective:     Principal Problem:Closed fracture of one rib of left side        HPI:  Ms. Faria is a 73/F with PMH HTN, HLD, CAD, h/o MI, h/o stroke x2 with residual left-sided hemiplegia who presented to [facility] 04/13 with a 2 week history of progressive left-sided chest wall and abdomen pain.  Two weeks prior to presentation she fell onto her left side while getting out of the bathtub; she went to [facility] and was told she had rib contusion and discharged home. Has had continued difficulty getting around her house and has been less mobile as well.  For the four days prior to presentation she had poor PO intake due to her decreased mobility.  She notes intermittent chills and dry cough for the two days prior to presentation with some abdominal discomfort, nausea and vomiting as well as no BM for the four days prior to presentation. With persistent discomfort she contacted EMS and received nebulization en route. In ED, workup was notable for UA with 27 WBCs, many bacteria, nitrite positive, no leukocytosis, CXR with potential L basilar atelectasis, and CT Abd/Pelvis with nonspecific gallbladder distention with mild displaced posterior L 8th rib fracture. She received ceftriaxone, azithromycin, morphine IV and hospital medicine was contacted for observation.    Overview/Hospital Course:  Admitted with L 8th rib fracture and UTI. Started ceftriaxone and PT/OT consulted as well as symptomatic control. Therapy recommended moderate intensity and placement sought.    Interval History: Patient with reports of coughing and increased SOB that kept her up most of the night and pain with her coughing spells.      Review of Systems   Constitutional:  Negative for chills and fever.   Respiratory:  Positive for cough and shortness of breath.    Cardiovascular:  Negative for chest pain and palpitations.   Gastrointestinal:  Negative for abdominal pain, nausea and vomiting.     Objective:     Vital Signs (Most Recent):  Temp: 98.8 °F (37.1 °C) (04/16/24 1555)  Pulse: 73 (04/16/24 1555)  Resp: 18 (04/16/24 1555)  BP: (!) 158/67 (04/16/24 1656)  SpO2: 95 % (04/16/24 1555) Vital Signs (24h Range):  Temp:  [98.2 °F (36.8 °C)-98.8 °F (37.1 °C)] 98.8 °F (37.1 °C)  Pulse:  [64-87] 73  Resp:  [16-20] 18  SpO2:  [91 %-98 %] 95 %  BP: (122-169)/(62-83) 158/67     Weight: 88 kg (194 lb 0.1 oz)  Body mass index is 39.18 kg/m².    Intake/Output Summary (Last 24 hours) at 4/16/2024 1725  Last data filed at 4/16/2024 1702  Gross per 24 hour   Intake 120 ml   Output 1400 ml   Net -1280 ml         Physical Exam  Vitals and nursing note reviewed.   Constitutional:       General: She is not in acute distress.     Appearance: She is well-developed.   HENT:      Head: Normocephalic and atraumatic.   Eyes:      General:         Right eye: No discharge.         Left eye: No discharge.      Conjunctiva/sclera: Conjunctivae normal.   Cardiovascular:      Rate and Rhythm: Normal rate.      Pulses: Normal pulses.   Pulmonary:      Effort: Pulmonary effort is normal. No respiratory distress.      Breath sounds: Wheezing present.      Comments: Bilateral wheezing   Chest:      Comments: Tender to palpation along L axilla.  Abdominal:      Palpations: Abdomen is soft.      Tenderness: There is abdominal tenderness (LUQ).   Musculoskeletal:      Right lower leg: No edema.      Left lower leg: No edema.      Comments: L-sided hemiplegia. Chronic LUE contracture.   Skin:     General: Skin is warm and dry.   Neurological:      Mental Status: She is alert and oriented to person, place, and time.      Comments: Hard of hearing. Chronic dysarthria.          Significant Labs:   CBC:  Recent Labs   Lab 04/14/24  0549 04/15/24  0323   WBC 6.73 7.08   HGB 14.9 13.2   HCT 47.0 41.3    162   GRAN 53.2  3.6 54.2  3.8   LYMPH 36.6  2.5 36.4  2.6   MONO 9.4  0.6 8.6  0.6   EOS 0.0 0.0   BASO 0.01 0.02   BMP:  Recent Labs   Lab 04/14/24  0549 04/15/24  0323    134*   K 4.3 4.4    102   CO2 26 22*   BUN 15 21   CREATININE 1.4 1.5*   GLU 96 86   CALCIUM 9.6 9.2   MG 2.1 2.2   PHOS 4.0 4.6*     Significant Imaging: I have reviewed and interpreted all pertinent imaging results/findings within the past 24 hours.    Assessment/Plan:      * Closed fracture of one rib of left side  Debility, fall, L hemiplegia  - 8th rib fracture likely secondary to recent fall; difficult to identify on CXR but noted on CT Abd/Pelvis.  - Symptomatic management; continue incentive spirometry, acetaminophen 1000mg PO TID, oxycodone 5mg/10mg PO q4hr PRN, methocarbamol 500mg PO TID PRN.  - PT/OT recommending moderate intensity. Pursuing SNF placement.    GERD (gastroesophageal reflux disease)  - Continue famotidine 20mg PO daily.    Constipation  - Start polyethylene glycol 17g PO BID, senna-docusate 8.6-50mg PO BID.  - Monitor for BM and adjust as required.    Acute cystitis without hematuria  - UA suggestive of UTI. Urine culture with GNRs to date.  - Continue ceftriaxone 1g IV q24hr; continue pending final culture results but anticipate completion of course soon.    Chronic obstructive pulmonary disease with acute exacerbation  - On Stiolto outpatient.  - Extensive coughing and wheezing noted on exam today, will start prednisone 40 mg daily and add azithromycin, and change albuterol-ipratropium 2.5-0.5mg inhaled q4hr while awake from PRN  - Continue with fluticasone-vilanterol 100-25mcg inhaled daily, tiotropium 5mcg inhaled daily    Coronary artery disease  HTN, HLD, h/o stroke  - Continue aspirin 81mg PO daily, atorvastatin 80mg PO daily, carvedilol 12.5mg PO BID, furosemide  40mg PO daily, losartan 100mg PO daily.      VTE Risk Mitigation (From admission, onward)           Ordered     enoxaparin injection 40 mg  Daily         04/13/24 0655     IP VTE HIGH RISK PATIENT  Once         04/13/24 0655                      Saskia Aquino MD  Department of Hospital Medicine   [facility] Surg ([address])

## 2024-04-16 NOTE — ASSESSMENT & PLAN NOTE
- On Stiolto outpatient.  - Extensive coughing and wheezing noted on exam today, will start prednisone 40 mg daily and add azithromycin, and change albuterol-ipratropium 2.5-0.5mg inhaled q4hr while awake from PRN  - Continue with fluticasone-vilanterol 100-25mcg inhaled daily, tiotropium 5mcg inhaled daily

## 2024-04-16 NOTE — NURSING
Midline without significant blood return to draw labs. Pt refusing to be stuck today despite educating about importance of lab values.

## 2024-04-16 NOTE — SUBJECTIVE & OBJECTIVE
Interval History: Patient with reports of coughing and increased SOB that kept her up most of the night and pain with her coughing spells.     Review of Systems   Constitutional:  Negative for chills and fever.   Respiratory:  Positive for cough and shortness of breath.    Cardiovascular:  Negative for chest pain and palpitations.   Gastrointestinal:  Negative for abdominal pain, nausea and vomiting.     Objective:     Vital Signs (Most Recent):  Temp: 98.8 °F (37.1 °C) (04/16/24 1555)  Pulse: 73 (04/16/24 1555)  Resp: 18 (04/16/24 1555)  BP: (!) 158/67 (04/16/24 1656)  SpO2: 95 % (04/16/24 1555) Vital Signs (24h Range):  Temp:  [98.2 °F (36.8 °C)-98.8 °F (37.1 °C)] 98.8 °F (37.1 °C)  Pulse:  [64-87] 73  Resp:  [16-20] 18  SpO2:  [91 %-98 %] 95 %  BP: (122-169)/(62-83) 158/67     Weight: 88 kg (194 lb 0.1 oz)  Body mass index is 39.18 kg/m².    Intake/Output Summary (Last 24 hours) at 4/16/2024 1725  Last data filed at 4/16/2024 1702  Gross per 24 hour   Intake 120 ml   Output 1400 ml   Net -1280 ml         Physical Exam  Vitals and nursing note reviewed.   Constitutional:       General: She is not in acute distress.     Appearance: She is well-developed.   HENT:      Head: Normocephalic and atraumatic.   Eyes:      General:         Right eye: No discharge.         Left eye: No discharge.      Conjunctiva/sclera: Conjunctivae normal.   Cardiovascular:      Rate and Rhythm: Normal rate.      Pulses: Normal pulses.   Pulmonary:      Effort: Pulmonary effort is normal. No respiratory distress.      Breath sounds: Wheezing present.      Comments: Bilateral wheezing   Chest:      Comments: Tender to palpation along L axilla.  Abdominal:      Palpations: Abdomen is soft.      Tenderness: There is abdominal tenderness (LUQ).   Musculoskeletal:      Right lower leg: No edema.      Left lower leg: No edema.      Comments: L-sided hemiplegia. Chronic LUE contracture.   Skin:     General: Skin is warm and dry.   Neurological:       Mental Status: She is alert and oriented to person, place, and time.      Comments: Hard of hearing. Chronic dysarthria.         Significant Labs:   CBC:  Recent Labs   Lab 04/14/24  0549 04/15/24  0323   WBC 6.73 7.08   HGB 14.9 13.2   HCT 47.0 41.3    162   GRAN 53.2  3.6 54.2  3.8   LYMPH 36.6  2.5 36.4  2.6   MONO 9.4  0.6 8.6  0.6   EOS 0.0 0.0   BASO 0.01 0.02   BMP:  Recent Labs   Lab 04/14/24  0549 04/15/24  0323    134*   K 4.3 4.4    102   CO2 26 22*   BUN 15 21   CREATININE 1.4 1.5*   GLU 96 86   CALCIUM 9.6 9.2   MG 2.1 2.2   PHOS 4.0 4.6*     Significant Imaging: I have reviewed and interpreted all pertinent imaging results/findings within the past 24 hours.

## 2024-04-16 NOTE — CONSULTS
[facility] ([address])  Adult Nutrition  Consult Note    SUMMARY     Recommendations  1) Commercial beverages BID   2) Continue cardiac diet    3) Honor pt's food preference to encourage intake of meals    4) RD to monitor and follow up    Goals:   Pt will be able to tolerate 50-75% EEN/EPN by RD follow up  Nutrition Goal Status: new  Communication of RD Recs:  (POC)    Assessment and Plan  Nutrition Problem  Inadequate nutrient intake    Related to (etiology):   Decreased mobility    Signs and Symptoms (as evidenced by):   Pt reports poor po intake 4 days pta  Nausea, SOB, wheezing  Pain   Behavorial (mealtime) - does not like grits or eggs for breakfast, very little intake    Interventions (treatment strategy):  Mineral/fat modified diet  Collaboration with other providers    Nutrition Diagnosis Status:   New      Malnutrition Assessment     Teeth (Micronutrient): edentulous                                 Reason for Assessment    Reason For Assessment: consultbraeden  Diagnosis:  (Closed fracture of one rib of left side)  Relevant Medical History:   Patient Active Problem List   Diagnosis    History of myocardial infarction    Essential hypertension    History of right MCA stroke    Nontoxic multinodular goiter    Age-related osteoporosis without current pathological fracture    Coronary artery disease    Chronic obstructive pulmonary disease    Bilateral neural hearing loss    Stress incontinence    History of seizures    Hyperlipidemia    Hemiplegia of left nondominant side due to cerebrovascular disease    Dysarthria    Closed fracture of one rib of left side    Debility    Fall    Acute cystitis without hematuria    Constipation    GERD (gastroesophageal reflux disease)       Interdisciplinary Rounds: did not attend  General Information Comments: Patient admitted for closed fracture of rib on left side. Pt receiving a cardiac diet, did not eat breakfast - states she does not like grits and  "eggs. Had nausea and pain at time of visit. L arm contracted noted. Pt does not have her dentures, denied chewing or swallowing issues. No recent weight loss noted per chart review. midline cath. Nutrition consult for low placido 15 - L axilla NFPE patient nourished at this time.  Nutrition Discharge Planning: dc on heart healthy oral diet post discharge    Nutrition Related Social Determinants of Health: SDOH: Adequate food in home environment    Nutrition Risk Screen    Nutrition Risk Screen: no indicators present    Nutrition/Diet History    Spiritual, Cultural Beliefs, Jew Practices, Values that Affect Care: no  Food Allergies: NKFA  Factors Affecting Nutritional Intake: pain, behavioral (mealtime)    Anthropometrics    Temp: 98.2 °F (36.8 °C)  Height Method: Stated  Height: 4' 11" (149.9 cm)  Height (inches): 59 in  Weight Method: Bed Scale  Weight: 88 kg (194 lb 0.1 oz)  Weight (lb): 194.01 lb  Ideal Body Weight (IBW), Female: 95 lb  % Ideal Body Weight, Female (lb): 204.22 %  BMI (Calculated): 39.2  BMI Grade: 35 - 39.9 - obesity - grade II       Lab/Procedures/Meds    Pertinent Labs Reviewed: reviewed  CBC:  No results for input(s): "WBC", "HGB", "HCT", "PLT" in the last 24 hours.  CMP:  No results for input(s): "GLUCOSE", "CALCIUM", "ALBUMIN", "PROT", "NA", "K", "CO2", "CL", "BUN", "CREATININE", "ALKPHOS", "ALT", "AST", "BILITOT" in the last 24 hours.  BMP:   Recent Labs   Lab 04/15/24  0323   GLU 86   *   K 4.4      CO2 22*   BUN 21   CREATININE 1.5*   CALCIUM 9.2   MG 2.2       Pertinent Medications Reviewed: reviewed  Scheduled Meds:  Current Facility-Administered Medications   Medication Dose Route Frequency Provider Last Rate Last Admin    acetaminophen tablet 1,000 mg  1,000 mg Oral TID GOPAL Alvares MD   1,000 mg at 04/16/24 0944    albuterol-ipratropium 2.5 mg-0.5 mg/3 mL nebulizer solution 3 mL  3 mL Nebulization Q4H Saskia Allen MD   3 mL at 04/16/24 1000    amLODIPine " tablet 5 mg  5 mg Oral Daily GOPAL Alvares MD   5 mg at 04/16/24 0945    aspirin EC tablet 81 mg  81 mg Oral Daily GOPAL Alvares MD   81 mg at 04/16/24 0944    atorvastatin tablet 80 mg  80 mg Oral Daily GOPAL Alvares MD   80 mg at 04/16/24 0945    [START ON 4/17/2024] azithromycin tablet 250 mg  250 mg Oral Daily Saskia Aquino MD        bisacodyL EC tablet 10 mg  10 mg Oral Daily GOPAL Alvares MD   10 mg at 04/14/24 1137    carvediloL tablet 12.5 mg  12.5 mg Oral BID WM GOPAL Alvares MD   12.5 mg at 04/16/24 0943    cefTRIAXone (Rocephin) 1 g in dextrose 5 % in water (D5W) 100 mL IVPB (MB+)  1 g Intravenous Q24H GOPAL Alvares MD   Stopped at 04/16/24 0548    enoxaparin injection 40 mg  40 mg Subcutaneous Daily GOPAL Alvares MD   40 mg at 04/15/24 1643    famotidine tablet 20 mg  20 mg Oral Daily GOPAL Alvares MD   20 mg at 04/16/24 0945    fluticasone furoate-vilanteroL 100-25 mcg/dose diskus inhaler 1 puff  1 puff Inhalation Daily GOPAL Alvares MD   1 puff at 04/16/24 1000    furosemide tablet 40 mg  40 mg Oral Daily GOPAL Alvares MD   40 mg at 04/16/24 0944    losartan tablet 100 mg  100 mg Oral Daily GOPAL Alvares MD   100 mg at 04/16/24 0942    melatonin tablet 6 mg  6 mg Oral Nightly PRN GOPAL Alvares MD        methocarbamoL tablet 500 mg  500 mg Oral TID PRN GOPAL Alvares MD   500 mg at 04/16/24 1235    morphine injection 2 mg  2 mg Intravenous Q4H PRN GOPAL Alvares MD   2 mg at 04/16/24 0329    ondansetron disintegrating tablet 4 mg  4 mg Oral Q6H PRN GOPAL Alvares MD   4 mg at 04/14/24 1903    ondansetron injection 4 mg  4 mg Intravenous Q6H PRN GOPAL Alvares MD   4 mg at 04/16/24 0958    oxyCODONE immediate release tablet 5 mg  5 mg Oral Q4H PRN GOPAL Alvares MD   5 mg at 04/15/24 2008    oxyCODONE immediate release tablet Tab 10 mg  10 mg Oral Q4H PRN GOPAL Alvares MD   10 mg at 04/16/24 0520    polyethylene glycol packet 17 g  17 g  Oral BID GOPAL lAvares MD   17 g at 04/16/24 0945    predniSONE tablet 40 mg  40 mg Oral Daily Saskia Aquino MD   40 mg at 04/16/24 0942    senna-docusate 8.6-50 mg per tablet 1 tablet  1 tablet Oral BID GOPAL Alvares MD   1 tablet at 04/16/24 0945    sodium chloride 0.9% flush 10 mL  10 mL Intravenous PRN GOPAL Alvares MD        sodium chloride 0.9% flush 10 mL  10 mL Intravenous Q6H GOPAL Alvares MD   10 mL at 04/16/24 1228    And    sodium chloride 0.9% flush 10 mL  10 mL Intravenous PRN GOPAL Alvares MD        tiotropium bromide 2.5 mcg/actuation inhaler 2 puff  2 puff Inhalation Daily GOPAL Alvares MD   2 puff at 04/16/24 1000     Continuous Infusions:  Current Facility-Administered Medications   Medication Dose Route Frequency Provider Last Rate Last Admin    acetaminophen tablet 1,000 mg  1,000 mg Oral TID GOPAL Alvares MD   1,000 mg at 04/16/24 0944    albuterol-ipratropium 2.5 mg-0.5 mg/3 mL nebulizer solution 3 mL  3 mL Nebulization Q4H WAKE Saskia Aquino MD   3 mL at 04/16/24 1000    amLODIPine tablet 5 mg  5 mg Oral Daily GOPAL Alvares MD   5 mg at 04/16/24 0945    aspirin EC tablet 81 mg  81 mg Oral Daily GOPAL Alvares MD   81 mg at 04/16/24 0944    atorvastatin tablet 80 mg  80 mg Oral Daily GOPAL Alvares MD   80 mg at 04/16/24 0945    [START ON 4/17/2024] azithromycin tablet 250 mg  250 mg Oral Daily Saskia Aquino MD        bisacodyL EC tablet 10 mg  10 mg Oral Daily GOPAL Alvares MD   10 mg at 04/14/24 1137    carvediloL tablet 12.5 mg  12.5 mg Oral BID  GOPAL Alvares MD   12.5 mg at 04/16/24 0943    cefTRIAXone (Rocephin) 1 g in dextrose 5 % in water (D5W) 100 mL IVPB (MB+)  1 g Intravenous Q24H GOPAL Alvares MD   Stopped at 04/16/24 0548    enoxaparin injection 40 mg  40 mg Subcutaneous Daily GOPAL Alvraes MD   40 mg at 04/15/24 1643    famotidine tablet 20 mg  20 mg Oral Daily GOPAL Alvares MD   20 mg at 04/16/24 0945    fluticasone  furoate-vilanteroL 100-25 mcg/dose diskus inhaler 1 puff  1 puff Inhalation Daily GOPAL Alvares MD   1 puff at 04/16/24 1000    furosemide tablet 40 mg  40 mg Oral Daily GOPAL Alvares MD   40 mg at 04/16/24 0944    losartan tablet 100 mg  100 mg Oral Daily GOPAL Alvares MD   100 mg at 04/16/24 0942    melatonin tablet 6 mg  6 mg Oral Nightly PRN GOPAL Alvares MD        methocarbamoL tablet 500 mg  500 mg Oral TID PRN GOPAL Alvares MD   500 mg at 04/16/24 1235    morphine injection 2 mg  2 mg Intravenous Q4H PRN GOPAL Alvares MD   2 mg at 04/16/24 0329    ondansetron disintegrating tablet 4 mg  4 mg Oral Q6H PRN GOPAL Alvares MD   4 mg at 04/14/24 1903    ondansetron injection 4 mg  4 mg Intravenous Q6H PRN GOPAL Alvares MD   4 mg at 04/16/24 0958    oxyCODONE immediate release tablet 5 mg  5 mg Oral Q4H PRN GOPAL Alvares MD   5 mg at 04/15/24 2008    oxyCODONE immediate release tablet Tab 10 mg  10 mg Oral Q4H PRN GOPAL Alvares MD   10 mg at 04/16/24 0520    polyethylene glycol packet 17 g  17 g Oral BID GOPAL Alvares MD   17 g at 04/16/24 0945    predniSONE tablet 40 mg  40 mg Oral Daily Saskia Aquino MD   40 mg at 04/16/24 0942    senna-docusate 8.6-50 mg per tablet 1 tablet  1 tablet Oral BID GOPAL Alvares MD   1 tablet at 04/16/24 0945    sodium chloride 0.9% flush 10 mL  10 mL Intravenous PRN GOPAL Alvares MD        sodium chloride 0.9% flush 10 mL  10 mL Intravenous Q6H GOPAL Alvares MD   10 mL at 04/16/24 1228    And    sodium chloride 0.9% flush 10 mL  10 mL Intravenous PRN GOPAL Alvares MD        tiotropium bromide 2.5 mcg/actuation inhaler 2 puff  2 puff Inhalation Daily GOPAL Alvares MD   2 puff at 04/16/24 1000     PRN Meds:.  Current Facility-Administered Medications   Medication Dose Route Frequency Provider Last Rate Last Admin    acetaminophen tablet 1,000 mg  1,000 mg Oral TID GOPAL Alvares MD   1,000 mg at 04/16/24 0972     albuterol-ipratropium 2.5 mg-0.5 mg/3 mL nebulizer solution 3 mL  3 mL Nebulization Q4H WAKE Saskia Aquino MD   3 mL at 04/16/24 1000    amLODIPine tablet 5 mg  5 mg Oral Daily GOPAL Alvares MD   5 mg at 04/16/24 0945    aspirin EC tablet 81 mg  81 mg Oral Daily GOPAL Alvares MD   81 mg at 04/16/24 0944    atorvastatin tablet 80 mg  80 mg Oral Daily GOPAL Alvares MD   80 mg at 04/16/24 0945    [START ON 4/17/2024] azithromycin tablet 250 mg  250 mg Oral Daily Saskia Aquino MD        bisacodyL EC tablet 10 mg  10 mg Oral Daily GOPAL Alvares MD   10 mg at 04/14/24 1137    carvediloL tablet 12.5 mg  12.5 mg Oral BID WM GOPAL Alvares MD   12.5 mg at 04/16/24 0943    cefTRIAXone (Rocephin) 1 g in dextrose 5 % in water (D5W) 100 mL IVPB (MB+)  1 g Intravenous Q24H GOPAL Alvares MD   Stopped at 04/16/24 0548    enoxaparin injection 40 mg  40 mg Subcutaneous Daily GOPAL Alvares MD   40 mg at 04/15/24 1643    famotidine tablet 20 mg  20 mg Oral Daily GOPAL Alvares MD   20 mg at 04/16/24 0945    fluticasone furoate-vilanteroL 100-25 mcg/dose diskus inhaler 1 puff  1 puff Inhalation Daily GOPAL Alvares MD   1 puff at 04/16/24 1000    furosemide tablet 40 mg  40 mg Oral Daily GOPAL Alvares MD   40 mg at 04/16/24 0944    losartan tablet 100 mg  100 mg Oral Daily GOPAL Alvares MD   100 mg at 04/16/24 0942    melatonin tablet 6 mg  6 mg Oral Nightly PRN GOPAL Alvares MD        methocarbamoL tablet 500 mg  500 mg Oral TID PRN GOPAL Alvares MD   500 mg at 04/16/24 1235    morphine injection 2 mg  2 mg Intravenous Q4H PRN GOPAL Alvares MD   2 mg at 04/16/24 0329    ondansetron disintegrating tablet 4 mg  4 mg Oral Q6H PRN GOPAL Alvares MD   4 mg at 04/14/24 1903    ondansetron injection 4 mg  4 mg Intravenous Q6H PRN GOPAL Alvares MD   4 mg at 04/16/24 0958    oxyCODONE immediate release tablet 5 mg  5 mg Oral Q4H PRN GOPAL Alvares MD   5 mg at 04/15/24 2008     oxyCODONE immediate release tablet Tab 10 mg  10 mg Oral Q4H PRN GOPAL Alvares MD   10 mg at 04/16/24 0520    polyethylene glycol packet 17 g  17 g Oral BID GOPAL Alvares MD   17 g at 04/16/24 0945    predniSONE tablet 40 mg  40 mg Oral Daily Saskia Aquino MD   40 mg at 04/16/24 0942    senna-docusate 8.6-50 mg per tablet 1 tablet  1 tablet Oral BID GOPAL Alvares MD   1 tablet at 04/16/24 0945    sodium chloride 0.9% flush 10 mL  10 mL Intravenous PRN GOPAL Alvares MD        sodium chloride 0.9% flush 10 mL  10 mL Intravenous Q6H GOPAL Alvares MD   10 mL at 04/16/24 1228    And    sodium chloride 0.9% flush 10 mL  10 mL Intravenous PRN GOPAL Alvares MD        tiotropium bromide 2.5 mcg/actuation inhaler 2 puff  2 puff Inhalation Daily GOPAL Alvares MD   2 puff at 04/16/24 1000         Estimated/Assessed Needs    Weight Used For Calorie Calculations: 88 kg (194 lb 0.1 oz)  Energy Calorie Requirements (kcal): 1664  Energy Need Method: [place]-St Jeor (x 1.3)  Protein Requirements: 88 g (1 g/kg)  Weight Used For Protein Calculations: 88 kg (194 lb 0.1 oz)  Fluid Requirements (mL): 1 mL/kcal  Estimated Fluid Requirement Method:  (or per MD)  RDA Method (mL): 1664         Nutrition Prescription Ordered    Current Diet Order: Cardiac    Evaluation of Received Nutrient/Fluid Intake    I/O: - / 1150  Energy Calories Required: not meeting needs  Protein Required: not meeting needs  Fluid Required: not meeting needs  Comments: LBM: 4/15/24  Tolerance: tolerating  % Intake of Estimated Energy Needs: 25 - 50 %  % Breakfast Meal Intake: 25 - 50 %    Intake/Output - Last 3 Shifts         04/14 0700  04/15 0659 04/15 0700 04/16 0659 04/16 0700 04/17 0659    P.O. 230      IV Piggyback 97.4      Total Intake(mL/kg) 327.4 (3.7)      Urine (mL/kg/hr) 550 (0.3) 1150 (0.5)     Other  0     Stool  0     Total Output 550 1150     Net -222.6 -1150            Urine Occurrence  2 x     Stool Occurrence  3 x              Nutrition Risk    Level of Risk/Frequency of Follow-up:  (1-2 x/week)       Monitor and Evaluation    Food and Nutrient Intake: energy intake, food and beverage intake  Food and Nutrient Adminstration: diet order  Physical Activity and Function: nutrition-related ADLs and IADLs  Anthropometric Measurements: weight, weight change, body mass index  Biochemical Data, Medical Tests and Procedures: electrolyte and renal panel, gastrointestinal profile, glucose/endocrine profile, inflammatory profile, lipid profile  Nutrition-Focused Physical Findings: overall appearance       Nutrition Follow-Up    RD Follow-up?: Yes    Yola Pryor RDN, DUNGN

## 2024-04-16 NOTE — PLAN OF CARE
Medicare Message     Important Message from Medicare regarding Discharge Appeal Rights Given to patient/caregiver; Explained to patient/caregiver; Other (comments)Important Message from Medicare regarding Discharge Appeal Rights. Given to patient/caregiver; Explained to patient/caregiver; Other (comments). The comment is Verbal Consent. Taken on 4/16/24 1159   Date IMM was signed 4/16/2024   Time IMM was signed [id number]

## 2024-04-16 NOTE — PLAN OF CARE
Message to Nancy ([facility]) to follow up on pending SNF auth - awaiting response

## 2024-04-17 VITALS
TEMPERATURE: 98 F | SYSTOLIC BLOOD PRESSURE: 121 MMHG | OXYGEN SATURATION: 96 % | BODY MASS INDEX: 39.11 KG/M2 | HEIGHT: 59 IN | WEIGHT: 194 LBS | HEART RATE: 71 BPM | RESPIRATION RATE: 18 BRPM | DIASTOLIC BLOOD PRESSURE: 56 MMHG

## 2024-04-17 PROCEDURE — 63700000 PHARM REV CODE 250 ALT 637 W/O HCPCS: Performed by: HOSPITALIST

## 2024-04-17 PROCEDURE — 97530 THERAPEUTIC ACTIVITIES: CPT | Mod: CQ

## 2024-04-17 PROCEDURE — 63600175 PHARM REV CODE 636 W HCPCS: Performed by: HOSPITALIST

## 2024-04-17 PROCEDURE — 63600175 PHARM REV CODE 636 W HCPCS: Performed by: INTERNAL MEDICINE

## 2024-04-17 PROCEDURE — 94799 UNLISTED PULMONARY SVC/PX: CPT | Mod: XB

## 2024-04-17 PROCEDURE — A4216 STERILE WATER/SALINE, 10 ML: HCPCS | Performed by: INTERNAL MEDICINE

## 2024-04-17 PROCEDURE — 94640 AIRWAY INHALATION TREATMENT: CPT

## 2024-04-17 PROCEDURE — 94761 N-INVAS EAR/PLS OXIMETRY MLT: CPT

## 2024-04-17 PROCEDURE — 25000242 PHARM REV CODE 250 ALT 637 W/ HCPCS: Performed by: HOSPITALIST

## 2024-04-17 PROCEDURE — 25000003 PHARM REV CODE 250: Performed by: INTERNAL MEDICINE

## 2024-04-17 RX ORDER — AZITHROMYCIN 250 MG/1
250 TABLET, FILM COATED ORAL DAILY
Start: 2024-04-18 | End: 2024-04-21

## 2024-04-17 RX ORDER — AMLODIPINE BESYLATE 5 MG/1
5 TABLET ORAL DAILY
Qty: 30 TABLET | Refills: 0 | Status: SHIPPED | OUTPATIENT
Start: 2024-04-17 | End: 2025-04-17

## 2024-04-17 RX ORDER — BISACODYL 5 MG
10 TABLET, DELAYED RELEASE (ENTERIC COATED) ORAL DAILY PRN
Start: 2024-04-17

## 2024-04-17 RX ORDER — AMOXICILLIN 250 MG
1 CAPSULE ORAL 2 TIMES DAILY
Start: 2024-04-17

## 2024-04-17 RX ORDER — FUROSEMIDE 40 MG/1
40 TABLET ORAL DAILY
Start: 2024-04-17 | End: 2025-04-17

## 2024-04-17 RX ORDER — OXYCODONE AND ACETAMINOPHEN 5; 325 MG/1; MG/1
1 TABLET ORAL EVERY 8 HOURS PRN
Qty: 20 EACH | Refills: 0 | Status: SHIPPED | OUTPATIENT
Start: 2024-04-17

## 2024-04-17 RX ORDER — ALBUTEROL SULFATE 0.83 MG/ML
SOLUTION RESPIRATORY (INHALATION)
Start: 2024-04-17

## 2024-04-17 RX ORDER — PREDNISONE 20 MG/1
40 TABLET ORAL DAILY
Start: 2024-04-18 | End: 2024-04-20

## 2024-04-17 RX ADMIN — ACETAMINOPHEN 1000 MG: 500 TABLET ORAL at 08:04

## 2024-04-17 RX ADMIN — IPRATROPIUM BROMIDE AND ALBUTEROL SULFATE 3 ML: 2.5; .5 SOLUTION RESPIRATORY (INHALATION) at 08:04

## 2024-04-17 RX ADMIN — ATORVASTATIN CALCIUM 80 MG: 20 TABLET, FILM COATED ORAL at 08:04

## 2024-04-17 RX ADMIN — AZITHROMYCIN DIHYDRATE 250 MG: 250 TABLET ORAL at 08:04

## 2024-04-17 RX ADMIN — PREDNISONE 40 MG: 20 TABLET ORAL at 08:04

## 2024-04-17 RX ADMIN — CARVEDILOL 12.5 MG: 12.5 TABLET, FILM COATED ORAL at 08:04

## 2024-04-17 RX ADMIN — LOSARTAN POTASSIUM 100 MG: 50 TABLET, FILM COATED ORAL at 08:04

## 2024-04-17 RX ADMIN — SODIUM CHLORIDE, PRESERVATIVE FREE 10 ML: 5 INJECTION INTRAVENOUS at 12:04

## 2024-04-17 RX ADMIN — IPRATROPIUM BROMIDE AND ALBUTEROL SULFATE 3 ML: 2.5; .5 SOLUTION RESPIRATORY (INHALATION) at 11:04

## 2024-04-17 RX ADMIN — FAMOTIDINE 20 MG: 20 TABLET ORAL at 08:04

## 2024-04-17 RX ADMIN — FUROSEMIDE 40 MG: 20 TABLET ORAL at 08:04

## 2024-04-17 RX ADMIN — ASPIRIN 81 MG: 81 TABLET, COATED ORAL at 08:04

## 2024-04-17 RX ADMIN — AMLODIPINE BESYLATE 5 MG: 5 TABLET ORAL at 08:04

## 2024-04-17 RX ADMIN — FLUTICASONE FUROATE AND VILANTEROL TRIFENATATE 1 PUFF: 100; 25 POWDER RESPIRATORY (INHALATION) at 08:04

## 2024-04-17 RX ADMIN — TIOTROPIUM BROMIDE INHALATION SPRAY 2 PUFF: 3.12 SPRAY, METERED RESPIRATORY (INHALATION) at 08:04

## 2024-04-17 RX ADMIN — CEFTRIAXONE SODIUM 1 G: 1 INJECTION, POWDER, FOR SOLUTION INTRAMUSCULAR; INTRAVENOUS at 05:04

## 2024-04-17 RX ADMIN — SODIUM CHLORIDE, PRESERVATIVE FREE 10 ML: 5 INJECTION INTRAVENOUS at 05:04

## 2024-04-17 NOTE — PLAN OF CARE
Pt AAOx4.  1 bowel movement during shift.  Purewick changed.  O2 @ 2L per NC.  Pt refused morning labs.  NP notified.      Problem: Adult Inpatient Plan of Care  Goal: Plan of Care Review  Outcome: Ongoing, Progressing  Goal: Patient-Specific Goal (Individualized)  Outcome: Ongoing, Progressing  Goal: Absence of Hospital-Acquired Illness or Injury  Outcome: Ongoing, Progressing  Goal: Optimal Comfort and Wellbeing  Outcome: Ongoing, Progressing  Goal: Readiness for Transition of Care  Outcome: Ongoing, Progressing     Problem: Adjustment to Illness COPD (Chronic Obstructive Pulmonary Disease)  Goal: Optimal Chronic Illness Coping  Outcome: Ongoing, Progressing     Problem: Functional Ability Impaired COPD (Chronic Obstructive Pulmonary Disease)  Goal: Optimal Level of Functional [place]  Outcome: Ongoing, Progressing     Problem: Respiratory Compromise COPD (Chronic Obstructive Pulmonary Disease)  Goal: Effective Oxygenation and Ventilation  Outcome: Ongoing, Progressing     Problem: Infection  Goal: Absence of Infection Signs and Symptoms  Outcome: Ongoing, Progressing     Problem: Fall Injury Risk  Goal: Absence of Fall and Fall-Related Injury  Outcome: Ongoing, Progressing     Problem: Skin Injury Risk Increased  Goal: Skin Health and Integrity  Outcome: Ongoing, Progressing

## 2024-04-17 NOTE — PLAN OF CARE
NURSING HOME ORDERS    04/17/2024  [facility] ([facility])  [facility] ([address])  [address]  [place] [address]  Dept: [phone]  Loc: [phone]     Admit to Nursing Home:  Skilled Nursing Facility    Diagnoses:  Active Hospital Problems    Diagnosis  POA    *Closed fracture of one rib of left side [S22.32XA]  Yes    Debility [R53.81]  Yes    Fall [W19.XXXA]  Yes    Acute cystitis without hematuria [N30.00]  Yes    Constipation [K59.00]  Yes    GERD (gastroesophageal reflux disease) [K21.9]  Yes    Hyperlipidemia [E78.5]  Yes     Chronic    Coronary artery disease [I25.10]  Yes     Chronic    Chronic obstructive pulmonary disease with acute exacerbation [J44.1]  Yes    History of right MCA stroke [Z86.73]  Not Applicable     Chronic    Essential hypertension [I10]  Yes     Chronic    Hemiplegia of left nondominant side due to cerebrovascular disease [I67.9, G81.94]  Not Applicable     Chronic      Resolved Hospital Problems   No resolved problems to display.       Patient is homebound due to:  Closed fracture of one rib of left side    Allergies:Review of patient's allergies indicates:  No Known Allergies    Vitals:  Routine    Diet: cardiac diet    Activities:   Activity as tolerated    Goals of Care Treatment Preferences:  Code Status: Full Code      Labs:  None    Nursing Precautions:  Aspiration , Fall, and Pressure ulcer prevention    Consults:   PT to evaluate and treat- 5 times a week, OT to evaluate and treat- 5 times a week, Wound Care, and Nutrition to evaluate and recommend diet     Miscellaneous Care:  Daily wound care  Left axilla Intertrigo:  Cleanse area gently with bathing wipes and pat dry. Place Interdry to folds as a single layer leaving 2 inches of product exposed to air.  Interdry may be used for up to 5 days- please hang moist interdry up to dry and reuse if unsoiled.                  Medications: Discontinue all previous medication orders, if any. See new  list below.     Medication List        START taking these medications      azithromycin 250 MG tablet  Commonly known as: Z-CAROLINA  Take 1 tablet (250 mg total) by mouth once daily. for 3 days  Start taking on: April 18, 2024 (COPD exacerbation)     bisacodyL 5 mg EC tablet  Commonly known as: DULCOLAX  Take 2 tablets (10 mg total) by mouth daily as needed for Constipation.     oxyCODONE-acetaminophen 5-325 mg per tablet  Commonly known as: PERCOCET  Take 1 tablet by mouth every 8 (eight) hours as needed for Pain.     predniSONE 20 MG tablet  Commonly known as: DELTASONE  Take 2 tablets (40 mg total) by mouth once daily. for 2 days  Start taking on: April 18, 2024 (COPD exacerbation)     senna-docusate 8.6-50 mg 8.6-50 mg per tablet  Commonly known as: PERICOLACE  Take 1 tablet by mouth 2 (two) times daily.     tiotropium bromide 2.5 mcg/actuation inhaler  Commonly known as: SPIRIVA RESPIMAT  Inhale 2 puffs into the lungs once daily. Controller  Start taking on: April 18, 2024            CHANGE how you take these medications      * VENTOLIN HFA 90 mcg/actuation inhaler  Generic drug: albuterol  Inhale 2 puffs into the lungs every 6 (six) hours as needed for Wheezing. Rescue  What changed: Another medication with the same name was changed. Make sure you understand how and when to take each.     * albuterol 2.5 mg /3 mL (0.083 %) nebulizer solution  Commonly known as: PROVENTIL  Every 4 hours while awake x 3 days then every 4 hours as needed for SOB/wheezing  What changed:   how much to take  how to take this  when to take this  reasons to take this  additional instructions           * This list has 2 medication(s) that are the same as other medications prescribed for you. Read the directions carefully, and ask your doctor or other care provider to review them with you.                CONTINUE taking these medications      amLODIPine 5 MG tablet  Commonly known as: NORVASC  Take 1 tablet by mouth once daily.     aspirin  81 MG EC tablet  Commonly known as: ECOTRIN  Take 1 tablet by mouth once daily.     atorvastatin 80 MG tablet  Commonly known as: LIPITOR  Take 1 tablet by mouth once daily.     carvediloL 12.5 MG tablet  Commonly known as: COREG  Take 1 tablet by mouth 2 (two) times daily with meals.     famotidine 20 MG tablet  Commonly known as: PEPCID  Take 1 tablet by mouth once daily.     furosemide 40 MG tablet  Commonly known as: LASIX  Take 1 tablet (40 mg total) by mouth once daily.     losartan 100 MG tablet  Commonly known as: COZAAR  Take 1 tablet by mouth once daily.     STIOLTO RESPIMAT 2.5-2.5 mcg/actuation Mist  Generic drug: tiotropium-olodateroL  Inhale 2 puffs into the lungs once daily.                _________________________________  Saskia Aquino MD  04/17/2024

## 2024-04-17 NOTE — NURSING
Discharge orders noted.  Midline removed.  Report called to Jackelin Skaggs LPN at [facility] [phone].  [facility] at bedside.

## 2024-04-17 NOTE — DISCHARGE SUMMARY
[facility] ([address] Medicine  Discharge Summary      Patient Name: Florence Faria  MRN: [id number]  [facility]: [id number]  Patient Class: IP- Inpatient  Admission Date: 4/13/2024  Hospital Length of Stay: 3 days  Discharge Date and Time: 4/17/2024  3:07 PM  Attending Physician:  Saskia Aquino MD  Discharging Provider: Saskia Aquino MD  Primary Care Provider: [facility]    Primary Care Team: Networked reference to record PCT     HPI:   Ms. Faria is a 73/F with PMH HTN, HLD, CAD, h/o MI, h/o stroke x2 with residual left-sided hemiplegia who [facility] with a 2 week history of progressive left-sided chest wall and abdomen pain.  Two weeks prior to presentation she fell onto her left side while getting out of the bathtub; she went to [facility] and was told she had rib contusion and discharged home. Has had continued difficulty getting around her house and has been less mobile as well.  For the four days prior to presentation she had poor PO intake due to her decreased mobility.  She notes intermittent chills and dry cough for the two days prior to presentation with some abdominal discomfort, nausea and vomiting as well as no BM for the four days prior to presentation. With persistent discomfort she contacted EMS and received nebulization en route. In ED, workup was notable for UA with 27 WBCs, many bacteria, nitrite positive, no leukocytosis, CXR with potential L basilar atelectasis, and CT Abd/Pelvis with nonspecific gallbladder distention with mild displaced posterior L 8th rib fracture. She received ceftriaxone, azithromycin, morphine IV and hospital medicine was contacted for observation.    * No surgery found *      Hospital Course:   Admitted with L 8th rib fracture and UTI. Started ceftriaxone and PT/OT consulted as well as symptomatic control. Patient completed full course of treatment for Klebsiella pneumoniae UTI with ceftriaxone during  hospital stay. She did have episode of COPD exacerbation treated with short course of prednisone, azithromycin and NEBS, with quick response and full course of treatment to be completed at SNF. Therapy recommended moderate intensity and placement arranged for SNF.    Closed fracture of one rib of left side  Debility, fall, L hemiplegia  - 8th rib fracture likely secondary to recent fall; difficult to identify on CXR but noted on CT Abd/Pelvis.  - Symptomatic management; continued incentive spirometry, acetaminophen 1000mg PO TID, oxycodone 5mg/10mg PO q4hr PRN, methocarbamol 500mg PO TID PRN.  - PT/OT recommending moderate intensity. Pursued SNF placement.    Acute cystitis without hematuria  - UA suggestive of UTI. Urine culture with Klebsiella pneumoniae sensitive to everything except for intermediate to nitrofurantoin  - Completed full course of treatment with ceftriaxone during hospitalization  - Resolved     Chronic obstructive pulmonary disease with acute exacerbation  - On [facility] outpatient.  - Continued with fluticasone-vilanterol 100-25mcg inhaled daily, tiotropium 5mcg inhaled daily  - Extensive coughing and wheezing noted on exam on 4/16, treated with prednisone 40 mg daily and azithromycin, and changed albuterol-ipratropium 2.5-0.5mg inhaled q4hr while awake from PRN  - Wheezing almost fully resolved, and patient was able to sleep without coughing waking her up   - Patient to complete short course of prednisone 40 mg daily x 4 days and full 5 day course of azithromycin inclusive of days completed in the hospital    Coronary artery disease  HTN, HLD, h/o stroke  - Continued aspirin 81mg PO daily, atorvastatin 80mg PO daily, carvedilol 12.5mg PO BID, furosemide 40mg PO daily, losartan 100mg PO daily.    GERD (gastroesophageal reflux disease)  - Continued famotidine 20mg PO daily.     Constipation  - Started polyethylene glycol 17g PO BID, senna-docusate 8.6-50mg PO BID.  - Multiple bowel movements,  resolved       Goals of Care Treatment Preferences:  Code Status: Full Code      Consults:   Consults (From admission, onward)          Status Ordering Provider     Inpatient consult to Registered Dietitian/Nutritionist  Once        Provider:  (Not yet assigned)    Completed GOPAL OSBORNE     Inpatient consult to Midline team  Once        Provider:  (Not yet assigned)    Acknowledged GOPAL OSBORNE          Service: Hospital Medicine    Final Active Diagnoses:    Diagnosis Date Noted POA    PRINCIPAL PROBLEM:  Closed fracture of one rib of left side [S22.32XA] 04/13/2024 Yes    Debility [R53.81] 04/13/2024 Yes    Fall [W19.XXXA] 04/13/2024 Yes    Acute cystitis without hematuria [N30.00] 04/13/2024 Yes    Constipation [K59.00] 04/13/2024 Yes    GERD (gastroesophageal reflux disease) [K21.9] 04/13/2024 Yes    Hyperlipidemia [E78.5] 01/05/2023 Yes     Chronic    Coronary artery disease [I25.10] 09/25/2019 Yes     Chronic    Chronic obstructive pulmonary disease with acute exacerbation [J44.1] 02/12/2019 Yes    History of right MCA stroke [Z86.73] 07/30/2018 Not Applicable     Chronic    Essential hypertension [I10] 07/30/2018 Yes     Chronic    Hemiplegia of left nondominant side due to cerebrovascular disease [I67.9, G81.94] 03/27/2013 Not Applicable     Chronic      Problems Resolved During this Admission:       Discharged Condition: good    Disposition: Skilled Nursing Facility    Follow Up:   Follow-up Information       [facility] Follow up in 1 week(s).    Contact information:  [address] [facility] [address] [phone]                           Patient Instructions:      Diet Cardiac     Activity as tolerated       Significant Diagnostic Studies: N/A    Pending Diagnostic Studies:       Procedure Component Value Units Date/Time    Basic Metabolic Panel (BMP) [[id number]]     Order Status: Sent Lab Status: No result     Specimen: Blood     Basic Metabolic Panel (BMP)  [[id number]]     Order Status: Sent Lab Status: No result     Specimen: Blood     CT Abdomen Pelvis  Without Contrast [[id number]] Resulted: 04/13/24 0504    Order Status: Sent Lab Status: In process Updated: 04/13/24 0504    Magnesium [[id number]]     Order Status: Sent Lab Status: No result     Specimen: Blood     Magnesium [[id number]]     Order Status: Sent Lab Status: No result     Specimen: Blood     Phosphorus [[id number]]     Order Status: Sent Lab Status: No result     Specimen: Blood     Phosphorus [[id number]]     Order Status: Sent Lab Status: No result     Specimen: Blood     X-Ray Abdomen Flat And Erect [[id number]] Resulted: 04/13/24 0330    Order Status: Sent Lab Status: In process Updated: 04/13/24 0338    X-Ray Chest AP Portable [[id number]] Resulted: 04/13/24 0330    Order Status: Sent Lab Status: In process Updated: 04/13/24 0338           Medications:  Reconciled Home Medications:      Medication List        START taking these medications      azithromycin 250 MG tablet  Commonly known as: Z-CAROLINA  Take 1 tablet (250 mg total) by mouth once daily. for 3 days  Start taking on: April 18, 2024     bisacodyL 5 mg EC tablet  Commonly known as: DULCOLAX  Take 2 tablets (10 mg total) by mouth daily as needed for Constipation.     oxyCODONE-acetaminophen 5-325 mg per tablet  Commonly known as: PERCOCET  Take 1 tablet by mouth every 8 (eight) hours as needed for Pain.     predniSONE 20 MG tablet  Commonly known as: DELTASONE  Take 2 tablets (40 mg total) by mouth once daily. for 2 days  Start taking on: April 18, 2024     senna-docusate 8.6-50 mg 8.6-50 mg per tablet  Commonly known as: PERICOLACE  Take 1 tablet by mouth 2 (two) times daily.     tiotropium bromide 2.5 mcg/actuation inhaler  Commonly known as: SPIRIVA RESPIMAT  Inhale 2 puffs into the lungs once daily. Controller  Start taking on: April 18, 2024            CHANGE how you take these medications      amLODIPine 5 MG tablet  Commonly known as:  NORVASC  Take 1 tablet (5 mg total) by mouth once daily.  What changed: when to take this     * VENTOLIN HFA 90 mcg/actuation inhaler  Generic drug: albuterol  Inhale 2 puffs into the lungs every 6 (six) hours as needed for Wheezing. Rescue  What changed: Another medication with the same name was changed. Make sure you understand how and when to take each.     * albuterol 2.5 mg /3 mL (0.083 %) nebulizer solution  Commonly known as: PROVENTIL  Every 4 hours while awake x 3 days then every 4 hours as needed for SOB/wheezing  What changed:   how much to take  how to take this  when to take this  reasons to take this  additional instructions           * This list has 2 medication(s) that are the same as other medications prescribed for you. Read the directions carefully, and ask your doctor or other care provider to review them with you.                CONTINUE taking these medications      aspirin 81 MG EC tablet  Commonly known as: ECOTRIN  Take 1 tablet by mouth once daily.     atorvastatin 80 MG tablet  Commonly known as: LIPITOR  Take 1 tablet by mouth once daily.     carvediloL 12.5 MG tablet  Commonly known as: COREG  Take 1 tablet by mouth 2 (two) times daily with meals.     famotidine 20 MG tablet  Commonly known as: PEPCID  Take 1 tablet by mouth once daily.     furosemide 40 MG tablet  Commonly known as: LASIX  Take 1 tablet (40 mg total) by mouth once daily.     losartan 100 MG tablet  Commonly known as: COZAAR  Take 1 tablet by mouth once daily.     STIOLTO RESPIMAT 2.5-2.5 mcg/actuation Mist  Generic drug: tiotropium-olodateroL  Inhale 2 puffs into the lungs once daily.              Indwelling Lines/Drains at time of discharge:   Lines/Drains/Airways       None                   Time spent on the discharge of patient: 35 minutes         Saskia Aquino MD  Department of Hospital Medicine  [facility] ([address])

## 2024-04-17 NOTE — PLAN OF CARE
Patient has been accepted to [facility]. Room and report number provided to nurse. ADT 30 placed.    04/17/24 1339   Final Note   Assessment Type Final Discharge Note   Anticipated Discharge Disposition SNF   Hospital Resources/Appts/Education Provided Provided patient/caregiver with written discharge plan information   Post-Acute Status   Post-Acute Authorization Placement   Post-Acute Placement Status Set-up Complete/Auth obtained   Discharge Delays (!) Ambulance Transport/Facility Transport     [facility] ([place] [facility])  Discharge Final Note    Primary Care Provider: [facility]    Expected Discharge Date: 4/17/2024    Final Discharge Note (most recent)       Final Note - 04/17/24 1339          Final Note    Assessment Type Final Discharge Note (P)      Anticipated Discharge Disposition Skilled Nursing Facility (P)      Hospital Resources/Appts/Education Provided Provided patient/caregiver with written discharge plan information (P)         Post-Acute Status    Post-Acute Authorization Placement (P)      Post-Acute Placement Status Set-up Complete/Auth obtained (P)      Discharge Delays Ambulance Transport/Facility Transport (P)                      Important Message from Medicare  Important Message from Medicare regarding Discharge Appeal Rights: Given to patient/caregiver, Explained to patient/caregiver, Other (comments) (Verbal Consent)     Date IMM was signed: 04/16/24  Time IMM was signed: 0937    Contact Info       [facility]   Relationship: PCP - General    [address] [facility] [address]   Phone: [phone]       Next Steps: Follow up in 1 week(s)

## 2024-04-17 NOTE — PT/OT/SLP PROGRESS
Physical Therapy Treatment    Patient Name:  Florence Fraia   MRN:  [id number]    Recommendations:     Discharge Recommendations: Moderate Intensity Therapy  Discharge Equipment Recommendations: to be determined by next level of care  Barriers to discharge: Decreased caregiver support    Assessment:     Florence Faria is a 73 y.o. female admitted with a medical diagnosis of Closed fracture of one rib of left side.  She presents with the following impairments/functional limitations: weakness, gait instability, abnormal tone, impaired endurance, impaired self care skills, impaired functional mobility, impaired balance, impaired cardiopulmonary response to activity, impaired coordination, impaired fine motor, impaired joint extensibility, decreased safety awareness, decreased lower extremity function, decreased upper extremity function, decreased ROM.    Supine to sit with Rosangela, BLE management required   Static sit at EOB with SBA for 5'   Sit to stand with Rosangela and hemiwalker, required cueing for hand placement on bed, informed pt on use of hemiwalker with sit to stand but preferred to have hands on therapist shoulder while in standing   Static stand for 1' with Min A with hemiwalker, required BUE support on therapist's shoulders, informed pt on proper use of hemiwalker but continued to use WC and therapist to remain in standing   Bed <> WC with stand pivot transfer with ModA for navigation   Sit to supine with MaxA for LE management and positioning   Pt agreeable to therapy, required extra time during session to change linens and perform karen-hygiene in standing, SpO2 prior to treatment with O2 doffed 84%, when donned prior to treatment 95% and post treatment 96%  Rec Moderate Intensity Therapy      Rehab Prognosis: Good; patient would benefit from acute skilled PT services to address these deficits and reach maximum level of function.    Recent Surgery: * No surgery found *      Plan:     During this  hospitalization, patient to be seen 5 x/week to address the identified rehab impairments via gait training, therapeutic activities, therapeutic exercises, neuromuscular re-education and progress toward the following goals:    Plan of Care Expires:  05/14/24    Subjective     Chief Complaint: I'm tired  Patient/Family Comments/goals: I'll get up with you  Pain/Comfort:  Pain Rating 1: 7/10 (pt not in percieivable pain, feels fine)  Location - Side 1: Left  Location - Orientation 1: generalized  Location 1: rib(s)  Pain Addressed 1: Pre-medicate for activity, Reposition, Distraction, Cessation of Activity  Pain Rating Post-Intervention 1: 7/10 (no increase in pain)      Objective:     Communicated with nurse Tucker prior to session.  Patient found HOB elevated with PureWick, PICC line, peripheral IV, bed alarm, oxygen upon PT entry to room.     General Precautions: Standard, fall  Orthopedic Precautions: N/A  Braces: N/A  Respiratory Status: Nasal cannula, flow 1 L/min, initially with O2 doffed, donned prior to treatment      Functional Mobility:  Bed Mobility:     Supine to Sit: minimum assistance  Sit to Supine: maximal assistance  Transfers:     Sit to Stand:  minimum assistance with hemiwalker  Bed to Chair: moderate assistance with  hemiwalker and WC arm rest  using  Stand Pivot  Balance: Static sit at EOB with SBA for 5',Static stand for 1' with Min A with hemiwalker, required BUE support on therapist's shoulders, informed pt on proper use of hemiwalker but continued to use WC and therapist to remain in standing        AM-PAC 6 CLICK MOBILITY  Turning over in bed (including adjusting bedclothes, sheets and blankets)?: 3  Sitting down on and standing up from a chair with arms (e.g., wheelchair, bedside commode, etc.): 3  Moving from lying on back to sitting on the side of the bed?: 3  Moving to and from a bed to a chair (including a wheelchair)?: 3  Need to walk in hospital room?: 1  Climbing 3-5 steps with a  railing?: 1  Basic Mobility Total Score: 14       Treatment & Education:  Educated pt on importance of OOB and role of PT  Transfers as noted     Patient left HOB elevated with all lines intact, call button in reach, bed alarm on, and nurse Caitlin notified..    GOALS:   Multidisciplinary Problems       Physical Therapy Goals          Problem: Physical Therapy    Goal Priority Disciplines Outcome Goal Variances Interventions   Physical Therapy Goal     PT, PT/OT Ongoing, Progressing     Description: Goals to be met by: 2024    Patient will increase functional independence with mobility by performin. Sit<>stand with SBA with Darnell walker.  2. Pivot transfer Bed<>WC with SBA.  3. Transfer supine<>sit with SBA without use of HB features                       Time Tracking:     PT Received On: 24  PT Start Time: 915     PT Stop Time: 958  PT Total Time (min): 43 min     Billable Minutes: Therapeutic Activity 43    Treatment Type: Treatment  PT/PTA: PTA     Number of PTA visits since last PT visit: 2     2024

## 2024-04-18 LAB
BACTERIA BLD CULT: NORMAL
BACTERIA BLD CULT: NORMAL